# Patient Record
Sex: MALE | Race: WHITE | NOT HISPANIC OR LATINO | Employment: FULL TIME | URBAN - METROPOLITAN AREA
[De-identification: names, ages, dates, MRNs, and addresses within clinical notes are randomized per-mention and may not be internally consistent; named-entity substitution may affect disease eponyms.]

---

## 2019-06-06 ENCOUNTER — OFFICE VISIT (OUTPATIENT)
Dept: FAMILY MEDICINE CLINIC | Facility: CLINIC | Age: 54
End: 2019-06-06
Payer: COMMERCIAL

## 2019-06-06 VITALS
HEART RATE: 64 BPM | TEMPERATURE: 99.9 F | OXYGEN SATURATION: 96 % | RESPIRATION RATE: 18 BRPM | SYSTOLIC BLOOD PRESSURE: 102 MMHG | HEIGHT: 71 IN | DIASTOLIC BLOOD PRESSURE: 64 MMHG | BODY MASS INDEX: 23.13 KG/M2 | WEIGHT: 165.2 LBS

## 2019-06-06 DIAGNOSIS — J06.9 VIRAL UPPER RESPIRATORY ILLNESS: Primary | ICD-10-CM

## 2019-06-06 DIAGNOSIS — H61.23 BILATERAL IMPACTED CERUMEN: ICD-10-CM

## 2019-06-06 LAB — S PYO AG THROAT QL: NEGATIVE

## 2019-06-06 PROCEDURE — 87880 STREP A ASSAY W/OPTIC: CPT | Performed by: NURSE PRACTITIONER

## 2019-06-06 PROCEDURE — 99213 OFFICE O/P EST LOW 20 MIN: CPT | Performed by: NURSE PRACTITIONER

## 2019-06-06 PROCEDURE — 3008F BODY MASS INDEX DOCD: CPT | Performed by: NURSE PRACTITIONER

## 2019-06-21 ENCOUNTER — OFFICE VISIT (OUTPATIENT)
Dept: FAMILY MEDICINE CLINIC | Facility: CLINIC | Age: 54
End: 2019-06-21
Payer: COMMERCIAL

## 2019-06-21 VITALS
WEIGHT: 159.7 LBS | DIASTOLIC BLOOD PRESSURE: 86 MMHG | HEIGHT: 71 IN | SYSTOLIC BLOOD PRESSURE: 120 MMHG | BODY MASS INDEX: 22.36 KG/M2 | HEART RATE: 60 BPM | RESPIRATION RATE: 18 BRPM

## 2019-06-21 DIAGNOSIS — Z12.11 ENCOUNTER FOR SCREENING COLONOSCOPY: Primary | ICD-10-CM

## 2019-06-21 DIAGNOSIS — Z00.00 WELLNESS EXAMINATION: Chronic | ICD-10-CM

## 2019-06-21 LAB
CHOLEST SERPL-MCNC: 149 MG/DL (ref 50–200)
EST. AVERAGE GLUCOSE BLD GHB EST-MCNC: 108 MG/DL
HBA1C MFR BLD: 5.4 % (ref 4.2–6.3)
HDLC SERPL-MCNC: 41 MG/DL (ref 40–60)
LDLC SERPL CALC-MCNC: 87 MG/DL (ref 0–100)
NONHDLC SERPL-MCNC: 108 MG/DL
TRIGL SERPL-MCNC: 106 MG/DL

## 2019-06-21 PROCEDURE — 83036 HEMOGLOBIN GLYCOSYLATED A1C: CPT | Performed by: FAMILY MEDICINE

## 2019-06-21 PROCEDURE — 80061 LIPID PANEL: CPT | Performed by: FAMILY MEDICINE

## 2019-06-21 PROCEDURE — 3044F HG A1C LEVEL LT 7.0%: CPT | Performed by: NURSE PRACTITIONER

## 2019-06-21 PROCEDURE — 36415 COLL VENOUS BLD VENIPUNCTURE: CPT | Performed by: FAMILY MEDICINE

## 2019-06-21 PROCEDURE — 99396 PREV VISIT EST AGE 40-64: CPT | Performed by: FAMILY MEDICINE

## 2019-06-28 ENCOUNTER — TELEPHONE (OUTPATIENT)
Dept: FAMILY MEDICINE CLINIC | Facility: CLINIC | Age: 54
End: 2019-06-28

## 2019-07-03 ENCOUNTER — TELEPHONE (OUTPATIENT)
Dept: FAMILY MEDICINE CLINIC | Facility: CLINIC | Age: 54
End: 2019-07-03

## 2019-11-14 ENCOUNTER — OFFICE VISIT (OUTPATIENT)
Dept: GASTROENTEROLOGY | Facility: CLINIC | Age: 54
End: 2019-11-14
Payer: COMMERCIAL

## 2019-11-14 VITALS
DIASTOLIC BLOOD PRESSURE: 69 MMHG | HEART RATE: 44 BPM | SYSTOLIC BLOOD PRESSURE: 109 MMHG | BODY MASS INDEX: 23.38 KG/M2 | WEIGHT: 167 LBS | TEMPERATURE: 96.8 F | HEIGHT: 71 IN

## 2019-11-14 DIAGNOSIS — K20.0 EOSINOPHILIC ESOPHAGITIS: Primary | ICD-10-CM

## 2019-11-14 DIAGNOSIS — R13.10 DYSPHAGIA, UNSPECIFIED TYPE: ICD-10-CM

## 2019-11-14 DIAGNOSIS — Z12.11 ENCOUNTER FOR SCREENING COLONOSCOPY: ICD-10-CM

## 2019-11-14 PROCEDURE — 99244 OFF/OP CNSLTJ NEW/EST MOD 40: CPT | Performed by: INTERNAL MEDICINE

## 2019-11-14 RX ORDER — OMEPRAZOLE 40 MG/1
CAPSULE, DELAYED RELEASE ORAL
Refills: 2 | COMMUNITY
Start: 2019-11-05 | End: 2019-11-14

## 2019-11-14 RX ORDER — OMEPRAZOLE 40 MG/1
40 CAPSULE, DELAYED RELEASE ORAL
Qty: 60 CAPSULE | Refills: 2 | Status: SHIPPED | OUTPATIENT
Start: 2019-11-14 | End: 2019-12-16

## 2019-11-14 NOTE — PROGRESS NOTES
[facility] - Outpatient Consultation  Diony Alvarenga 49 y o  male MRN: [id number]  Encounter: [id number]          ASSESSMENT AND PLAN:    Diony Alvarenga is a 49 y o  male who presents with complaint of eosinophils on prior EGD esophageal biopsies, possibly from GERD vs EoE  Now on PPI but he has not received repeat EGD with biopsies to distinguish between the 2 conditions  Dysphagia likely from this underlying process, now somewhat improved  1  Eosinophilic esophagitis    2  Encounter for screening colonoscopy    3  Dysphagia, unspecified type      Orders Placed This Encounter   Procedures    EGD     -- BID PPI for now  -- Repeat EGD with biopsies to evaluate for EoE vs GERD  -- Further recs pending above  -- Next colonsocopy as previously recommended    ______________________________________________________________________    HPI:    Diony Alvarenga is a 49 y o  male who presents with complaint of possible EoE  Here for evaluation of possible EoE  January 2018 had an SSA    He has neen having dysphagia for several months  Initially meat but progressed to other foods  He saw a GI phsyician in August, and he had an empiric dilation  He was diagnosed with EoE at that time  He was put on omeprazole 40mg daily  Since that time he has experienced improvement from dysphagia  No heartburn, odynophagia  Doesn't smoke or drink alcohol  REVIEW OF SYSTEMS:  10 point ROS reviewed and negative, except as above    Historical Information   History reviewed  No pertinent past medical history    Past Surgical History:   Procedure Laterality Date    COLONOSCOPY      UPPER GASTROINTESTINAL ENDOSCOPY       Social History   Social History     Substance and Sexual Activity   Alcohol Use Yes     Social History     Substance and Sexual Activity   Drug Use Never     Social History     Tobacco Use   Smoking Status Never Smoker   Smokeless Tobacco Never Used     Family History   Problem Relation Age of Onset    Kidney cancer Father     Aortic aneurysm Father         AAA    Leukemia Family     Diabetes Family        Meds/Allergies       Current Outpatient Medications:     omeprazole (PriLOSEC) 40 MG capsule    No Known Allergies        Objective     Blood pressure 109/69, pulse (!) 44, temperature (!) 96 8 °F (36 °C), temperature source Tympanic, height 5' 11" (1 803 m), weight 75 8 kg (167 lb)  Body mass index is 23 29 kg/m²  PHYSICAL EXAM:      General Appearance:   Alert, cooperative, no distress   HEENT:   Normocephalic, atraumatic, anicteric  Neck:  Supple, symmetrical, trachea midline   Lungs:   Clear to auscultation bilaterally; no rales, rhonchi or wheezing; respirations unlabored    Heart[de-identified]   Regular rate and rhythm; no murmur, rub, or gallop  Abdomen:   Soft, non-tender, non-distended; normal bowel sounds; no masses, no organomegaly    Genitalia:   Deferred    Rectal:   Deferred    Extremities:  No cyanosis, clubbing or edema    Pulses:  2+ and symmetric    Skin:  No jaundice, rashes, or lesions    Lymph nodes:  No palpable cervical lymphadenopathy        Lab Results:   No visits with results within 1 Day(s) from this visit  Latest known visit with results is:   Office Visit on 06/21/2019   Component Date Value    Cholesterol 06/21/2019 149     Triglycerides 06/21/2019 106     HDL, Direct 06/21/2019 41     LDL Calculated 06/21/2019 87     Non-HDL-Chol (CHOL-HDL) 06/21/2019 108     Hemoglobin A1C 06/21/2019 5 4     EAG 06/21/2019 108      No results found for: WBC, HGB, HCT, MCV, PLT    No results found for: NA, SODIUM, K, CL, CO2, ANIONGAP, AGAP, BUN, CREATININE, GLUC, GLUF, CALCIUM, AST, ALT, ALKPHOS, PROT, TP, BILITOT, TBILI, EGFR      Radiology Results:   No results found

## 2019-11-14 NOTE — PATIENT INSTRUCTIONS
EGD scheduled with Dr Crow at [facility] on 12/3/2019  Christine Martinez gave patient verbal instructions/paper work given  Patient aware transportation arranged   Jaciel vega

## 2019-11-14 NOTE — H&P (VIEW-ONLY)
[facility] - Outpatient Consultation  Reine Nyhan 49 y o  male MRN: [id number]  Encounter: [id number]          ASSESSMENT AND PLAN:    Reine Nyhan is a 49 y o  male who presents with complaint of eosinophils on prior EGD esophageal biopsies, possibly from GERD vs EoE  Now on PPI but he has not received repeat EGD with biopsies to distinguish between the 2 conditions  Dysphagia likely from this underlying process, now somewhat improved  1  Eosinophilic esophagitis    2  Encounter for screening colonoscopy    3  Dysphagia, unspecified type      Orders Placed This Encounter   Procedures    EGD     -- BID PPI for now  -- Repeat EGD with biopsies to evaluate for EoE vs GERD  -- Further recs pending above  -- Next colonsocopy as previously recommended    ______________________________________________________________________    HPI:    Reine Nyhan is a 49 y o  male who presents with complaint of possible EoE  Here for evaluation of possible EoE  January 2018 had an SSA    He has neen having dysphagia for several months  Initially meat but progressed to other foods  He saw a GI phsyician in August, and he had an empiric dilation  He was diagnosed with EoE at that time  He was put on omeprazole 40mg daily  Since that time he has experienced improvement from dysphagia  No heartburn, odynophagia  Doesn't smoke or drink alcohol  REVIEW OF SYSTEMS:  10 point ROS reviewed and negative, except as above    Historical Information   History reviewed  No pertinent past medical history    Past Surgical History:   Procedure Laterality Date    COLONOSCOPY      UPPER GASTROINTESTINAL ENDOSCOPY       Social History   Social History     Substance and Sexual Activity   Alcohol Use Yes     Social History     Substance and Sexual Activity   Drug Use Never     Social History     Tobacco Use   Smoking Status Never Smoker   Smokeless Tobacco Never Used     Family History   Problem Relation Age of Onset    Kidney cancer Father     Aortic aneurysm Father         AAA    Leukemia Family     Diabetes Family        Meds/Allergies       Current Outpatient Medications:     omeprazole (PriLOSEC) 40 MG capsule    No Known Allergies        Objective     Blood pressure 109/69, pulse (!) 44, temperature (!) 96 8 °F (36 °C), temperature source Tympanic, height 5' 11" (1 803 m), weight 75 8 kg (167 lb)  Body mass index is 23 29 kg/m²  PHYSICAL EXAM:      General Appearance:   Alert, cooperative, no distress   HEENT:   Normocephalic, atraumatic, anicteric  Neck:  Supple, symmetrical, trachea midline   Lungs:   Clear to auscultation bilaterally; no rales, rhonchi or wheezing; respirations unlabored    Heart[de-identified]   Regular rate and rhythm; no murmur, rub, or gallop  Abdomen:   Soft, non-tender, non-distended; normal bowel sounds; no masses, no organomegaly    Genitalia:   Deferred    Rectal:   Deferred    Extremities:  No cyanosis, clubbing or edema    Pulses:  2+ and symmetric    Skin:  No jaundice, rashes, or lesions    Lymph nodes:  No palpable cervical lymphadenopathy        Lab Results:   No visits with results within 1 Day(s) from this visit  Latest known visit with results is:   Office Visit on 06/21/2019   Component Date Value    Cholesterol 06/21/2019 149     Triglycerides 06/21/2019 106     HDL, Direct 06/21/2019 41     LDL Calculated 06/21/2019 87     Non-HDL-Chol (CHOL-HDL) 06/21/2019 108     Hemoglobin A1C 06/21/2019 5 4     EAG 06/21/2019 108      No results found for: WBC, HGB, HCT, MCV, PLT    No results found for: NA, SODIUM, K, CL, CO2, ANIONGAP, AGAP, BUN, CREATININE, GLUC, GLUF, CALCIUM, AST, ALT, ALKPHOS, PROT, TP, BILITOT, TBILI, EGFR      Radiology Results:   No results found

## 2019-11-18 ENCOUNTER — TELEPHONE (OUTPATIENT)
Dept: GASTROENTEROLOGY | Facility: AMBULARY SURGERY CENTER | Age: 54
End: 2019-11-18

## 2019-11-18 NOTE — TELEPHONE ENCOUNTER
Patients GI provider:  Dr Zuleika Marshall     Number to return call: [phone]    Reason for call: Pt is calling to set up transportation for his procedure     Scheduled procedure/appointment date if applicable: procedure   12/03

## 2019-11-20 ENCOUNTER — TELEPHONE (OUTPATIENT)
Dept: GASTROENTEROLOGY | Facility: AMBULARY SURGERY CENTER | Age: 54
End: 2019-11-20

## 2019-11-20 NOTE — TELEPHONE ENCOUNTER
Patients GI provider:  Dr Zuleika Marshall     Number to return call: [phone]    Reason for call: Pt called stating he called his insurance to start a prior auth but they advised him the office have to call  Pt not sure if it is needed but want to make sure he is fully covered  Scheduled procedure/appointment date if applicable: Apt/procedure   n/a

## 2019-11-27 ENCOUNTER — ANESTHESIA EVENT (OUTPATIENT)
Dept: GASTROENTEROLOGY | Facility: MEDICAL CENTER | Age: 54
End: 2019-11-27

## 2019-12-03 ENCOUNTER — ANESTHESIA (OUTPATIENT)
Dept: GASTROENTEROLOGY | Facility: MEDICAL CENTER | Age: 54
End: 2019-12-03

## 2019-12-03 ENCOUNTER — HOSPITAL ENCOUNTER (OUTPATIENT)
Dept: GASTROENTEROLOGY | Facility: MEDICAL CENTER | Age: 54
Setting detail: OUTPATIENT SURGERY
Discharge: HOME | End: 2019-12-03
Attending: INTERNAL MEDICINE | Admitting: INTERNAL MEDICINE
Payer: COMMERCIAL

## 2019-12-03 VITALS
WEIGHT: 167 LBS | BODY MASS INDEX: 23.38 KG/M2 | RESPIRATION RATE: 16 BRPM | OXYGEN SATURATION: 96 % | HEART RATE: 36 BPM | SYSTOLIC BLOOD PRESSURE: 114 MMHG | DIASTOLIC BLOOD PRESSURE: 61 MMHG | HEIGHT: 71 IN | TEMPERATURE: 98.5 F

## 2019-12-03 DIAGNOSIS — K20.0 EOSINOPHILIC ESOPHAGITIS: ICD-10-CM

## 2019-12-03 DIAGNOSIS — R13.10 DYSPHAGIA, UNSPECIFIED TYPE: ICD-10-CM

## 2019-12-03 PROCEDURE — 88312 SPECIAL STAINS GROUP 1: CPT | Performed by: PATHOLOGY

## 2019-12-03 PROCEDURE — 88305 TISSUE EXAM BY PATHOLOGIST: CPT | Performed by: PATHOLOGY

## 2019-12-03 PROCEDURE — 43239 EGD BIOPSY SINGLE/MULTIPLE: CPT | Performed by: INTERNAL MEDICINE

## 2019-12-03 RX ORDER — PROPOFOL 10 MG/ML
INJECTION, EMULSION INTRAVENOUS AS NEEDED
Status: DISCONTINUED | OUTPATIENT
Start: 2019-12-03 | End: 2019-12-03 | Stop reason: SURG

## 2019-12-03 RX ORDER — SODIUM CHLORIDE 9 MG/ML
125 INJECTION, SOLUTION INTRAVENOUS CONTINUOUS
Status: DISCONTINUED | OUTPATIENT
Start: 2019-12-03 | End: 2019-12-07 | Stop reason: HOSPADM

## 2019-12-03 RX ADMIN — SODIUM CHLORIDE: 0.9 INJECTION, SOLUTION INTRAVENOUS at 13:45

## 2019-12-03 RX ADMIN — PROPOFOL 100 MG: 10 INJECTION, EMULSION INTRAVENOUS at 13:52

## 2019-12-03 RX ADMIN — PROPOFOL 200 MG: 10 INJECTION, EMULSION INTRAVENOUS at 13:47

## 2019-12-03 RX ADMIN — SODIUM CHLORIDE 125 ML/HR: 0.9 INJECTION, SOLUTION INTRAVENOUS at 13:24

## 2019-12-03 NOTE — ANESTHESIA PREPROCEDURE EVALUATION
Review of Systems/Medical History  Patient summary reviewed  Chart reviewed      Cardiovascular  Exercise tolerance (METS): >4,     Pulmonary  Negative pulmonary ROS        GI/Hepatic    GERD ,        Negative  ROS        Endo/Other  Negative endo/other ROS      GYN       Hematology  Negative hematology ROS      Musculoskeletal  Negative musculoskeletal ROS        Neurology  Negative neurology ROS      Psychology   Negative psychology ROS              Physical Exam    Airway    Mallampati score: II  TM Distance: <3 FB  Neck ROM: full     Dental       Cardiovascular  Rhythm: regular, Rate: normal,     Pulmonary  Breath sounds clear to auscultation,     Other Findings        Anesthesia Plan  ASA Score- 2     Anesthesia Type- IV sedation with anesthesia with ASA Monitors  Additional Monitors:   Airway Plan:         Plan Factors-Patient not instructed to abstain from smoking on day of procedure  Patient did not smoke on day of surgery  Induction- intravenous  Postoperative Plan-     Informed Consent- Anesthetic plan and risks discussed with patient

## 2019-12-03 NOTE — DISCHARGE INSTRUCTIONS
Upper Endoscopy   WHAT YOU NEED TO KNOW:   An upper endoscopy is also called an upper gastrointestinal (GI) endoscopy, or an esophagogastroduodenoscopy (EGD)  You may feel bloated, gassy, or have some abdominal discomfort after your procedure  Your throat may be sore for 24 to 36 hours  You may burp or pass gas from air that is still inside your body  DISCHARGE INSTRUCTIONS:   Call 911 for any of the following:   · You have sudden chest pain or trouble breathing  Seek care immediately if:   · You feel dizzy or faint  · You have trouble swallowing  · Your bowel movements are very dark or black  · Your abdomen is hard and firm and you have severe pain  · You vomit blood  Contact your healthcare provider if:   · You feel full or bloated and cannot burp or pass gas  · You have not had a bowel movement for 3 days after your procedure  · You have neck pain  · You have a fever or chills  · You have nausea or are vomiting  · You have a rash or hives  · You have questions or concerns about your endoscopy  Relieve a sore throat:  Suck on throat lozenges or crushed ice  Gargle with a small amount of warm salt water  Mix 1 teaspoon of salt and 1 cup of warm water to make salt water  Relieve gas and discomfort from bloating:  Lie on your right side with a heating pad on your abdomen  Take short walks to help pass gas  Eat small meals until bloating is relieved  Rest after your procedure: You have been given medicine to relax you  Do not  drive or make important decisions until the day after your procedure  Return to your normal activity as directed  You can usually return to work the day after your procedure  Follow up with your healthcare provider as directed:  Write down your questions so you remember to ask them during your visits     © 2017 [address] is for End User's use only and may not be sold, redistributed or otherwise used for commercial purposes  All illustrations and images included in CareNotes® are the copyrighted property of A D A M [employer] Inc  or Ezekiel [employer]ges  The above information is an  only  It is not intended as medical advice for individual conditions or treatments  Talk to your doctor, nurse or pharmacist before following any medical regimen to see if it is safe and effective for you

## 2019-12-03 NOTE — ANESTHESIA POSTPROCEDURE EVALUATION
Post-Op Assessment Note    CV Status:  Stable  Pain Score: 1    Pain management: adequate     Mental Status:  Alert and awake   Hydration Status:  Euvolemic   PONV Controlled:  Controlled   Airway Patency:  Patent   Post Op Vitals Reviewed: Yes      Staff: Anesthesiologist           /61 (12/03/19 1415)    Temp      Pulse (!) 36 (12/03/19 1415)   Resp 16 (12/03/19 1415)    SpO2 96 % (12/03/19 1415)

## 2019-12-16 ENCOUNTER — OFFICE VISIT (OUTPATIENT)
Dept: GASTROENTEROLOGY | Facility: CLINIC | Age: 54
End: 2019-12-16
Payer: COMMERCIAL

## 2019-12-16 VITALS
BODY MASS INDEX: 23.24 KG/M2 | SYSTOLIC BLOOD PRESSURE: 131 MMHG | HEART RATE: 42 BPM | WEIGHT: 166 LBS | DIASTOLIC BLOOD PRESSURE: 76 MMHG | HEIGHT: 71 IN | TEMPERATURE: 94.8 F

## 2019-12-16 DIAGNOSIS — K21.9 GASTROESOPHAGEAL REFLUX DISEASE, ESOPHAGITIS PRESENCE NOT SPECIFIED: Primary | ICD-10-CM

## 2019-12-16 PROCEDURE — 99214 OFFICE O/P EST MOD 30 MIN: CPT | Performed by: INTERNAL MEDICINE

## 2019-12-16 RX ORDER — OMEPRAZOLE 20 MG/1
20 CAPSULE, DELAYED RELEASE ORAL
Qty: 30 CAPSULE | Refills: 5 | Status: SHIPPED | OUTPATIENT
Start: 2019-12-16 | End: 2020-12-21 | Stop reason: SDUPTHER

## 2019-12-16 NOTE — PROGRESS NOTES
[facility] - Outpatient Follow-up Note  Harris Pérez 49 y o  male MRN: [id number]  Encounter: [id number]          ASSESSMENT AND PLAN:    Harris Pérez is a 49 y o  male who presents with complaint of EoE vs GERD  We reviewed the pathology and explained he likely has GERD (with improvement biopsies on PPI) vs PPI responsive EoE  He is currently asymptomatic  Available labs and imaging reviewed  1  Gastroesophageal reflux disease, esophagitis presence not specified      No orders of the defined types were placed in this encounter  -- Continue PPI but at lowest dose (20mg once daily to be taken 30 minutes before breakfast)  -- Plan to repeat EGD in 6 months  If that is normal, would stop PPI altogether and repeat in another 6 months after that        ______________________________________________________________________    SUBJECTIVE:    Harris Pérez is a 49 y o  male who presents with complaint of possible EoE vs GERD  No heartburn  No belching  No dysphagia, odynophagia, nausea, vomiting, abdominal pain, diarrhea  Mil dconstipation the past few days when he took iron, now better  No BRBPR or black stools  · Irregular Z-line 45 cm from the incisors  · The esophagus appeared normal  · Performed multiple biopsies in the upper third of the esophagus, middle third of the esophagus and lower third of the esophagus  · The stomach and duodenum appeared normal     A  Esophagogastric junction, eg junction biopsy/esophagitis biopsy:  -Benign gastroesophageal junction mucosa with mild chronic inflammation and reactive epithelial changes   -Focal pancreatic heterotropia seen   -No evidence of eosinophilic esophagitis   -No evidence of Goblet cell metaplasia/ Brito esophagus   -No evidence of glandular dysplasia or malignancy seen  Fungal stains will be ordered and the result will be issued in the supplemental report     B   Esophagus, lower esophagus r/o EOE biopsy:  -Benign squamous epithelium with mild chronic inflammation   -No evidence of eosinophilic esophagitis   -No evidence of Goblet cell metaplasia/ Britos esophagus   -No evidence of dysplasia or malignancy seen        C  Esophagus, mid/upper esophagus biopsy r/o  EOE biopsy:  -Benign squamous epithelium with vascular congestion & mild chronic inflammation   -No evidence of eosinophilic esophagitis   -No evidence of Goblet cell metaplasia/ Britos esophagus   -No evidence of dysplasia or malignancy seen    REVIEW OF SYSTEMS IS OTHERWISE NEGATIVE  Historical Information   Past Medical History:   Diagnosis Date    Abnormal findings on esophagogastroduodenoscopy (EGD)     Eosinophilic esophagitis     GERD (gastroesophageal reflux disease)      Past Surgical History:   Procedure Laterality Date    COLONOSCOPY      UPPER GASTROINTESTINAL ENDOSCOPY       Social History   Social History     Substance and Sexual Activity   Alcohol Use Yes     Social History     Substance and Sexual Activity   Drug Use Never     Social History     Tobacco Use   Smoking Status Never Smoker   Smokeless Tobacco Never Used     Family History   Problem Relation Age of Onset    Kidney cancer Father     Aortic aneurysm Father         AAA    Leukemia Family     Diabetes Family        Meds/Allergies       Current Outpatient Medications:     omeprazole (PriLOSEC) 40 MG capsule    No Known Allergies        Objective     Blood pressure 131/76, pulse (!) 42, temperature (!) 94 8 °F (34 9 °C), temperature source Tympanic, height 5' 11" (1 803 m), weight 75 3 kg (166 lb)  Body mass index is 23 15 kg/m²  PHYSICAL EXAM:      General Appearance:   Alert, cooperative, no distress   HEENT:   Normocephalic, atraumatic, anicteric       Neck:  Supple, symmetrical, trachea midline   Lungs:   Clear to auscultation bilaterally; no rales, rhonchi or wheezing; respirations unlabored    Heart[de-identified]   Regular rate and rhythm; no murmur, rub, or gallop  Abdomen:   Soft, non-tender, non-distended; normal bowel sounds; no masses, no organomegaly    Genitalia:   Deferred    Rectal:   Deferred    Extremities:  No cyanosis, clubbing or edema    Pulses:  2+ and symmetric    Skin:  No jaundice, rashes, or lesions    Lymph nodes:  No palpable cervical lymphadenopathy        Lab Results:   No visits with results within 1 Day(s) from this visit  Latest known visit with results is:   Hospital Outpatient Visit on 12/03/2019   Component Date Value    Case Report 12/03/2019                      Value:Surgical Pathology Report                         Case: [id number]                                   Authorizing Provider:  Kalie Garza MD    Collected:           12/03/2019 1352              Ordering Location:     [facility]        Received:            12/03/2019 [facility] Endoscopy                                                     Pathologist:           David Alas MD                                                              Specimens:   A) - Esophagogastric junction, eg junction biopsy/esophagitis                                       B) - Esophagus, lower esophagus r/o eoe                                                             C) - Esophagus, mid/upper esophagus biopsy r/o eoe                                         Addendum 12/03/2019                      Value: This result contains rich text formatting which cannot be displayed here   Final Diagnosis 12/03/2019                      Value: This result contains rich text formatting which cannot be displayed here   Additional Information 12/03/2019                      Value: This result contains rich text formatting which cannot be displayed here  [facility] Gross Description 12/03/2019                      Value: This result contains rich text formatting which cannot be displayed here  Radiology Results:   No results found

## 2019-12-16 NOTE — PATIENT INSTRUCTIONS
Today we discussed:  -- We will decrease the omeprazole to 20mg every day to be taken 30 minutes before breakfast  -- We will repeat an upper endoscopy in 6 months to see if there is any change/     Please b in touch with any questions, concerns or updates

## 2020-02-10 ENCOUNTER — OFFICE VISIT (OUTPATIENT)
Dept: PODIATRY | Facility: CLINIC | Age: 55
End: 2020-02-10
Payer: COMMERCIAL

## 2020-02-10 VITALS
SYSTOLIC BLOOD PRESSURE: 109 MMHG | BODY MASS INDEX: 23.32 KG/M2 | HEIGHT: 71 IN | HEART RATE: 60 BPM | WEIGHT: 166.6 LBS | DIASTOLIC BLOOD PRESSURE: 72 MMHG

## 2020-02-10 DIAGNOSIS — M21.42 PES PLANUS OF BOTH FEET: Primary | ICD-10-CM

## 2020-02-10 DIAGNOSIS — M21.41 PES PLANUS OF BOTH FEET: Primary | ICD-10-CM

## 2020-02-10 DIAGNOSIS — M79.671 FOOT PAIN, RIGHT: ICD-10-CM

## 2020-02-10 DIAGNOSIS — M20.11 VALGUS DEFORMITY OF BOTH GREAT TOES: ICD-10-CM

## 2020-02-10 DIAGNOSIS — M20.12 VALGUS DEFORMITY OF BOTH GREAT TOES: ICD-10-CM

## 2020-02-10 PROCEDURE — 1036F TOBACCO NON-USER: CPT | Performed by: PODIATRIST

## 2020-02-10 PROCEDURE — 99203 OFFICE O/P NEW LOW 30 MIN: CPT | Performed by: PODIATRIST

## 2020-02-10 PROCEDURE — 3008F BODY MASS INDEX DOCD: CPT | Performed by: PODIATRIST

## 2020-02-10 NOTE — PROGRESS NOTES
Assessment/Plan:       Diagnoses and all orders for this visit:    Foot pain, right  -     X-ray foot right 3+ views; Future    Pes planus of both feet    Valgus deformity of both great toes  Comments:  asymptomatic, monitor for now, arch supports would help  1  Diagnosis and options discussed  Reviewed patients XRays with him  Minor TN spurring dorsally with adult flatfoot noted  Patient's issues seem to be musculoskeletal related due to age and adult acquired flatfoot deformity  This should respond well to a custom orthotic device within issues  Referral sent  He does not show symptoms of posterior tibial tendon dysfunction at this time but he is at risk for developing this should his foot not be adequately supported  2  Educated patient on the mechanics of his feet and other conservative options  I do not feel the patient would need reconstruction at this point  I would like to check his progress in approximately 3-4 months after he has been wearing the orthotic devices for least 4-6 weeks  Subjective:      Patient ID: Tanisha Bianchi is a 49 y o  male  Patient presents with foot pain  He had an achilles injury on his right ankle a while ago which resolved with some therapy  He began to run again and last August he started getting a weird arch pain  HE stopped running and switched to bike and swimming  He notices when he plantarflexes and inverts he can feel this sharp pain  He is very active but this is really hampering his exercises and day to day activity  He has not tried any inserts  He tries not to take NSAIDs due to his GERD  The following portions of the patient's history were reviewed and updated as appropriate: allergies, current medications, past family history, past medical history, past social history, past surgical history and problem list     Review of Systems   Constitutional: Negative  Respiratory: Negative for cough and shortness of breath      Cardiovascular: Negative for leg swelling  Gastrointestinal: Negative for diarrhea and nausea  Musculoskeletal: Positive for arthralgias and gait problem  Skin: Negative for color change  Neurological: Negative for numbness  Objective:      /72   Pulse 60   Ht 5' 11" (1 803 m) Comment: verbal  Wt 75 6 kg (166 lb 9 6 oz)   BMI 23 24 kg/m²          Physical Exam   Constitutional: He is oriented to person, place, and time  He appears well-developed and well-nourished  No distress  Cardiovascular: Intact distal pulses  Pulses:       Dorsalis pedis pulses are 2+ on the right side, and 2+ on the left side  Posterior tibial pulses are 2+ on the right side, and 2+ on the left side  Pulmonary/Chest: Effort normal  No respiratory distress  Musculoskeletal: Normal range of motion  He exhibits tenderness and deformity (Bilateral hallux abductovalgus without metatarsophalangeal joint crepitus or limited range of motion  No pain )  On stance patient is over pronated with moderate calcaneal valgus and positive Helbing sign  There is talar head peaking bilaterally without posterior tibial tendon pain or weakness  No anterior tibial tendon pain or weakness  No crepitus with talonavicular joint range of motion  There is tenderness over the talonavicular joint but not at the tuberosity  Feet:   Right Foot:   Protective Sensation: 10 sites tested  10 sites sensed  Left Foot:   Protective Sensation: 10 sites tested  10 sites sensed  Neurological: He is alert and oriented to person, place, and time  No sensory deficit  Skin: Skin is warm and dry  No rash noted  Psychiatric: He has a normal mood and affect  Vitals reviewed  XRay 3 views right foot shows  1  Calcaneal inclination angle grossly decreased (9 degrees)  2  Mearys angle increased with 33% talar head peeking on AP,   3  Small navicular spur dorsally TN joint

## 2020-02-18 ENCOUNTER — EVALUATION (OUTPATIENT)
Dept: PHYSICAL THERAPY | Facility: CLINIC | Age: 55
End: 2020-02-18
Payer: COMMERCIAL

## 2020-02-18 ENCOUNTER — OFFICE VISIT (OUTPATIENT)
Dept: PHYSICAL THERAPY | Facility: CLINIC | Age: 55
End: 2020-02-18

## 2020-02-18 DIAGNOSIS — M79.671 FOOT PAIN, RIGHT: ICD-10-CM

## 2020-02-18 DIAGNOSIS — M79.671 FOOT PAIN, RIGHT: Primary | ICD-10-CM

## 2020-02-18 DIAGNOSIS — M21.42 PES PLANUS OF BOTH FEET: ICD-10-CM

## 2020-02-18 DIAGNOSIS — M20.11 VALGUS DEFORMITY OF BOTH GREAT TOES: ICD-10-CM

## 2020-02-18 DIAGNOSIS — M20.12 VALGUS DEFORMITY OF BOTH GREAT TOES: ICD-10-CM

## 2020-02-18 DIAGNOSIS — M21.41 PES PLANUS OF BOTH FEET: ICD-10-CM

## 2020-02-18 PROCEDURE — 97162 PT EVAL MOD COMPLEX 30 MIN: CPT | Performed by: PHYSICAL THERAPIST

## 2020-02-18 PROCEDURE — 97760 ORTHOTIC MGMT&TRAING 1ST ENC: CPT | Performed by: PHYSICAL THERAPIST

## 2020-02-18 NOTE — PROGRESS NOTES
PT Evaluation     Today's date: 2020  Patient name: Irma Davies  : 1965  MRN: [id number]  Referring provider: JOSUE Castellon  Dx:   Encounter Diagnosis     ICD-10-CM    1  Foot pain, right M79 671 Ambulatory referral to Physical Therapy   2  Pes planus of both feet M21 41 Ambulatory referral to Physical Therapy    M21 42    3  Valgus deformity of both great toes M20 11 Ambulatory referral to Physical Therapy    M20 12     asymptomatic, monitor for now, arch supports would help  Assessment/Plan  Pt should benefit from custom orthotics to decrease foot pain and improve LOF  Cast for cusotm orthotics, fit in 2-3 wks  Subjective Evaluation    History of Present Illness  Mechanism of injury: Insidious onset of R ankle pain (medial/dorsal) last August   Also c/o arch pain  He has stopped running due to symptoms  He has not had orthotics in the past  No c/o L foot/ankle pain    Pain  Current pain ratin  At best pain ratin  At worst pain ratin      Diagnostic Tests  X-ray: abnormal  Treatments  No previous or current treatments  Patient Goals  Patient goals for therapy: return to sport/leisure activities          Objective    bilat bunions/hallux valgus R > L  bilat pes planus  LMI R 8 deg manish, L 8 deg manish  Tibia R 3 deg joseph, L 3 deg joseph  Hallux DF PROM wfl, no pain  Ankle PROM wnl; pain end range inversion  Gait: excessive STJ pronation, lateral toe gripping  MMT DF, inv wnl  Mild pain with inv    Casted for custom orthotics

## 2020-03-05 ENCOUNTER — OFFICE VISIT (OUTPATIENT)
Dept: PHYSICAL THERAPY | Facility: CLINIC | Age: 55
End: 2020-03-05
Payer: COMMERCIAL

## 2020-03-05 ENCOUNTER — TREATMENT (OUTPATIENT)
Dept: FAMILY MEDICINE CLINIC | Facility: CLINIC | Age: 55
End: 2020-03-05

## 2020-03-05 DIAGNOSIS — M20.11 VALGUS DEFORMITY OF BOTH GREAT TOES: Primary | ICD-10-CM

## 2020-03-05 DIAGNOSIS — M20.12 VALGUS DEFORMITY OF BOTH GREAT TOES: Primary | ICD-10-CM

## 2020-03-05 DIAGNOSIS — D64.9 ANEMIA, UNSPECIFIED TYPE: Primary | ICD-10-CM

## 2020-03-05 DIAGNOSIS — M79.671 FOOT PAIN, RIGHT: ICD-10-CM

## 2020-03-05 PROCEDURE — L3010 FOOT LONGITUDINAL ARCH SUPPO: HCPCS | Performed by: PHYSICAL THERAPIST

## 2020-03-05 NOTE — PROGRESS NOTES
Dispensed othotics, IP in break in period  Pt reported comfort    Many need to narrow devices [FreeTextEntry1] : Ms. BRAVO presents today for a follow up visit of a history of a right MCA stroke with residual left sided facial spams and left sided weakness with a history of DM and cardiac disease. \par \par At this time she reports she is doing well. No changes since last seen. Had Botox today with Dr. Freitas. \par \par Reports poor sleep with excessive daytime sleepiness. She denies snoring since she lost weight. In addition, she continues to report delayed recall and word finding that is not progressive. Continues to follow with endocrinology as she reports her DM is not well controlled at this time.

## 2020-03-20 ENCOUNTER — APPOINTMENT (OUTPATIENT)
Dept: LAB | Facility: CLINIC | Age: 55
End: 2020-03-20
Payer: COMMERCIAL

## 2020-03-20 LAB
BASOPHILS # BLD AUTO: 0.03 THOUSANDS/ΜL (ref 0–0.1)
BASOPHILS NFR BLD AUTO: 1 % (ref 0–1)
EOSINOPHIL # BLD AUTO: 0.04 THOUSAND/ΜL (ref 0–0.61)
EOSINOPHIL NFR BLD AUTO: 1 % (ref 0–6)
ERYTHROCYTE [DISTWIDTH] IN BLOOD BY AUTOMATED COUNT: 14.6 % (ref 11.6–15.1)
FERRITIN SERPL-MCNC: 8 NG/ML (ref 8–388)
HCT VFR BLD AUTO: 42.2 % (ref 36.5–49.3)
HGB BLD-MCNC: 13.4 G/DL (ref 12–17)
IMM GRANULOCYTES # BLD AUTO: 0.02 THOUSAND/UL (ref 0–0.2)
IMM GRANULOCYTES NFR BLD AUTO: 0 % (ref 0–2)
LYMPHOCYTES # BLD AUTO: 0.67 THOUSANDS/ΜL (ref 0.6–4.47)
LYMPHOCYTES NFR BLD AUTO: 13 % (ref 14–44)
MCH RBC QN AUTO: 27.6 PG (ref 26.8–34.3)
MCHC RBC AUTO-ENTMCNC: 31.8 G/DL (ref 31.4–37.4)
MCV RBC AUTO: 87 FL (ref 82–98)
MONOCYTES # BLD AUTO: 0.46 THOUSAND/ΜL (ref 0.17–1.22)
MONOCYTES NFR BLD AUTO: 9 % (ref 4–12)
NEUTROPHILS # BLD AUTO: 3.88 THOUSANDS/ΜL (ref 1.85–7.62)
NEUTS SEG NFR BLD AUTO: 76 % (ref 43–75)
NRBC BLD AUTO-RTO: 0 /100 WBCS
PLATELET # BLD AUTO: 275 THOUSANDS/UL (ref 149–390)
PMV BLD AUTO: 10.6 FL (ref 8.9–12.7)
RBC # BLD AUTO: 4.85 MILLION/UL (ref 3.88–5.62)
WBC # BLD AUTO: 5.1 THOUSAND/UL (ref 4.31–10.16)

## 2020-03-20 PROCEDURE — 36415 COLL VENOUS BLD VENIPUNCTURE: CPT | Performed by: FAMILY MEDICINE

## 2020-03-20 PROCEDURE — 85025 COMPLETE CBC W/AUTO DIFF WBC: CPT | Performed by: FAMILY MEDICINE

## 2020-03-20 PROCEDURE — 82728 ASSAY OF FERRITIN: CPT | Performed by: FAMILY MEDICINE

## 2020-03-21 ENCOUNTER — PATIENT MESSAGE (OUTPATIENT)
Dept: FAMILY MEDICINE CLINIC | Facility: CLINIC | Age: 55
End: 2020-03-21

## 2020-04-21 ENCOUNTER — TELEPHONE (OUTPATIENT)
Dept: GASTROENTEROLOGY | Facility: CLINIC | Age: 55
End: 2020-04-21

## 2020-04-22 ENCOUNTER — OFFICE VISIT (OUTPATIENT)
Dept: FAMILY MEDICINE CLINIC | Facility: CLINIC | Age: 55
End: 2020-04-22
Payer: COMMERCIAL

## 2020-04-22 VITALS
OXYGEN SATURATION: 98 % | WEIGHT: 155 LBS | HEART RATE: 59 BPM | DIASTOLIC BLOOD PRESSURE: 80 MMHG | BODY MASS INDEX: 22.19 KG/M2 | SYSTOLIC BLOOD PRESSURE: 102 MMHG | HEIGHT: 70 IN

## 2020-04-22 DIAGNOSIS — L30.9 DERMATITIS: Primary | ICD-10-CM

## 2020-04-22 PROCEDURE — 99213 OFFICE O/P EST LOW 20 MIN: CPT | Performed by: FAMILY MEDICINE

## 2020-04-22 PROCEDURE — 1036F TOBACCO NON-USER: CPT | Performed by: FAMILY MEDICINE

## 2020-04-22 PROCEDURE — 3008F BODY MASS INDEX DOCD: CPT | Performed by: FAMILY MEDICINE

## 2020-04-22 RX ORDER — TRIAMCINOLONE ACETONIDE 1 MG/G
CREAM TOPICAL 2 TIMES DAILY
Qty: 15 G | Refills: 3 | Status: SHIPPED | OUTPATIENT
Start: 2020-04-22 | End: 2020-06-24

## 2020-05-11 ENCOUNTER — DOCUMENTATION (OUTPATIENT)
Dept: FAMILY MEDICINE CLINIC | Facility: CLINIC | Age: 55
End: 2020-05-11

## 2020-05-13 ENCOUNTER — PREP FOR PROCEDURE (OUTPATIENT)
Dept: GASTROENTEROLOGY | Facility: MEDICAL CENTER | Age: 55
End: 2020-05-13

## 2020-05-13 ENCOUNTER — OFFICE VISIT (OUTPATIENT)
Dept: PODIATRY | Facility: CLINIC | Age: 55
End: 2020-05-13
Payer: COMMERCIAL

## 2020-05-13 VITALS — WEIGHT: 166.6 LBS | BODY MASS INDEX: 23.85 KG/M2 | HEIGHT: 70 IN

## 2020-05-13 DIAGNOSIS — Z20.822 ENCOUNTER FOR LABORATORY TESTING FOR COVID-19 VIRUS: ICD-10-CM

## 2020-05-13 DIAGNOSIS — M76.821 POSTERIOR TIBIAL TENDONITIS, RIGHT: Primary | ICD-10-CM

## 2020-05-13 DIAGNOSIS — M21.41 PES PLANUS OF RIGHT FOOT: ICD-10-CM

## 2020-05-13 DIAGNOSIS — K20.0 EOSINOPHILIC ESOPHAGITIS: Primary | ICD-10-CM

## 2020-05-13 PROCEDURE — 99213 OFFICE O/P EST LOW 20 MIN: CPT | Performed by: PODIATRIST

## 2020-05-13 PROCEDURE — 3008F BODY MASS INDEX DOCD: CPT | Performed by: PODIATRIST

## 2020-05-13 PROCEDURE — 1036F TOBACCO NON-USER: CPT | Performed by: PODIATRIST

## 2020-05-15 ENCOUNTER — ANESTHESIA EVENT (OUTPATIENT)
Dept: GASTROENTEROLOGY | Facility: MEDICAL CENTER | Age: 55
End: 2020-05-15

## 2020-05-21 ENCOUNTER — EVALUATION (OUTPATIENT)
Dept: PHYSICAL THERAPY | Facility: CLINIC | Age: 55
End: 2020-05-21
Payer: COMMERCIAL

## 2020-05-21 DIAGNOSIS — M76.821 POSTERIOR TIBIAL TENDONITIS, RIGHT: ICD-10-CM

## 2020-05-21 DIAGNOSIS — M21.41 PES PLANUS OF RIGHT FOOT: ICD-10-CM

## 2020-05-21 PROCEDURE — 97035 APP MDLTY 1+ULTRASOUND EA 15: CPT | Performed by: PHYSICAL THERAPIST

## 2020-05-21 PROCEDURE — 97760 ORTHOTIC MGMT&TRAING 1ST ENC: CPT | Performed by: PHYSICAL THERAPIST

## 2020-05-21 PROCEDURE — 97161 PT EVAL LOW COMPLEX 20 MIN: CPT | Performed by: PHYSICAL THERAPIST

## 2020-05-21 PROCEDURE — 97140 MANUAL THERAPY 1/> REGIONS: CPT | Performed by: PHYSICAL THERAPIST

## 2020-05-27 ENCOUNTER — OFFICE VISIT (OUTPATIENT)
Dept: PHYSICAL THERAPY | Facility: CLINIC | Age: 55
End: 2020-05-27
Payer: COMMERCIAL

## 2020-05-27 DIAGNOSIS — M76.821 POSTERIOR TIBIAL TENDONITIS, RIGHT: Primary | ICD-10-CM

## 2020-05-27 DIAGNOSIS — M21.41 PES PLANUS OF RIGHT FOOT: ICD-10-CM

## 2020-05-27 PROCEDURE — 97110 THERAPEUTIC EXERCISES: CPT | Performed by: PHYSICAL THERAPIST

## 2020-05-27 PROCEDURE — 97014 ELECTRIC STIMULATION THERAPY: CPT | Performed by: PHYSICAL THERAPIST

## 2020-05-27 PROCEDURE — 97140 MANUAL THERAPY 1/> REGIONS: CPT | Performed by: PHYSICAL THERAPIST

## 2020-05-27 PROCEDURE — G0283 ELEC STIM OTHER THAN WOUND: HCPCS | Performed by: PHYSICAL THERAPIST

## 2020-05-28 ENCOUNTER — OFFICE VISIT (OUTPATIENT)
Dept: PHYSICAL THERAPY | Facility: CLINIC | Age: 55
End: 2020-05-28
Payer: COMMERCIAL

## 2020-05-28 DIAGNOSIS — M21.41 PES PLANUS OF RIGHT FOOT: ICD-10-CM

## 2020-05-28 DIAGNOSIS — M76.821 POSTERIOR TIBIAL TENDONITIS, RIGHT: Primary | ICD-10-CM

## 2020-05-28 PROCEDURE — 97110 THERAPEUTIC EXERCISES: CPT | Performed by: PHYSICAL THERAPIST

## 2020-05-28 PROCEDURE — 97035 APP MDLTY 1+ULTRASOUND EA 15: CPT | Performed by: PHYSICAL THERAPIST

## 2020-05-28 PROCEDURE — 97140 MANUAL THERAPY 1/> REGIONS: CPT | Performed by: PHYSICAL THERAPIST

## 2020-05-28 PROCEDURE — G0283 ELEC STIM OTHER THAN WOUND: HCPCS | Performed by: PHYSICAL THERAPIST

## 2020-05-28 PROCEDURE — 97014 ELECTRIC STIMULATION THERAPY: CPT | Performed by: PHYSICAL THERAPIST

## 2020-05-29 DIAGNOSIS — Z20.822 ENCOUNTER FOR LABORATORY TESTING FOR COVID-19 VIRUS: ICD-10-CM

## 2020-05-29 PROCEDURE — U0003 INFECTIOUS AGENT DETECTION BY NUCLEIC ACID (DNA OR RNA); SEVERE ACUTE RESPIRATORY SYNDROME CORONAVIRUS 2 (SARS-COV-2) (CORONAVIRUS DISEASE [COVID-19]), AMPLIFIED PROBE TECHNIQUE, MAKING USE OF HIGH THROUGHPUT TECHNOLOGIES AS DESCRIBED BY CMS-2020-01-R: HCPCS

## 2020-06-01 ENCOUNTER — OFFICE VISIT (OUTPATIENT)
Dept: PHYSICAL THERAPY | Facility: CLINIC | Age: 55
End: 2020-06-01
Payer: COMMERCIAL

## 2020-06-01 DIAGNOSIS — M76.821 POSTERIOR TIBIAL TENDONITIS, RIGHT: Primary | ICD-10-CM

## 2020-06-01 DIAGNOSIS — M21.41 PES PLANUS OF RIGHT FOOT: ICD-10-CM

## 2020-06-01 PROCEDURE — 97140 MANUAL THERAPY 1/> REGIONS: CPT | Performed by: PHYSICAL THERAPIST

## 2020-06-01 PROCEDURE — 97110 THERAPEUTIC EXERCISES: CPT | Performed by: PHYSICAL THERAPIST

## 2020-06-01 PROCEDURE — 97035 APP MDLTY 1+ULTRASOUND EA 15: CPT | Performed by: PHYSICAL THERAPIST

## 2020-06-02 ENCOUNTER — OFFICE VISIT (OUTPATIENT)
Dept: PHYSICAL THERAPY | Facility: CLINIC | Age: 55
End: 2020-06-02
Payer: COMMERCIAL

## 2020-06-02 DIAGNOSIS — M21.41 PES PLANUS OF RIGHT FOOT: ICD-10-CM

## 2020-06-02 DIAGNOSIS — M76.821 POSTERIOR TIBIAL TENDONITIS, RIGHT: Primary | ICD-10-CM

## 2020-06-02 LAB — SARS-COV-2 RNA SPEC QL NAA+PROBE: NOT DETECTED

## 2020-06-02 PROCEDURE — 97035 APP MDLTY 1+ULTRASOUND EA 15: CPT | Performed by: PHYSICAL THERAPIST

## 2020-06-02 PROCEDURE — 97140 MANUAL THERAPY 1/> REGIONS: CPT | Performed by: PHYSICAL THERAPIST

## 2020-06-02 PROCEDURE — 97110 THERAPEUTIC EXERCISES: CPT | Performed by: PHYSICAL THERAPIST

## 2020-06-03 ENCOUNTER — OFFICE VISIT (OUTPATIENT)
Dept: PHYSICAL THERAPY | Facility: CLINIC | Age: 55
End: 2020-06-03
Payer: COMMERCIAL

## 2020-06-03 DIAGNOSIS — M76.821 POSTERIOR TIBIAL TENDONITIS, RIGHT: Primary | ICD-10-CM

## 2020-06-03 DIAGNOSIS — M21.41 PES PLANUS OF RIGHT FOOT: ICD-10-CM

## 2020-06-03 PROCEDURE — 97110 THERAPEUTIC EXERCISES: CPT | Performed by: PHYSICAL THERAPIST

## 2020-06-03 PROCEDURE — 97035 APP MDLTY 1+ULTRASOUND EA 15: CPT | Performed by: PHYSICAL THERAPIST

## 2020-06-03 PROCEDURE — 97140 MANUAL THERAPY 1/> REGIONS: CPT | Performed by: PHYSICAL THERAPIST

## 2020-06-04 ENCOUNTER — HOSPITAL ENCOUNTER (OUTPATIENT)
Dept: GASTROENTEROLOGY | Facility: MEDICAL CENTER | Age: 55
Setting detail: OUTPATIENT SURGERY
Discharge: HOME | End: 2020-06-04
Attending: INTERNAL MEDICINE | Admitting: INTERNAL MEDICINE
Payer: COMMERCIAL

## 2020-06-04 ENCOUNTER — ANESTHESIA (OUTPATIENT)
Dept: GASTROENTEROLOGY | Facility: MEDICAL CENTER | Age: 55
End: 2020-06-04

## 2020-06-04 ENCOUNTER — NURSE TRIAGE (OUTPATIENT)
Dept: OTHER | Facility: OTHER | Age: 55
End: 2020-06-04

## 2020-06-04 VITALS
BODY MASS INDEX: 23.77 KG/M2 | RESPIRATION RATE: 19 BRPM | SYSTOLIC BLOOD PRESSURE: 132 MMHG | WEIGHT: 166 LBS | HEART RATE: 40 BPM | TEMPERATURE: 98.4 F | DIASTOLIC BLOOD PRESSURE: 73 MMHG | OXYGEN SATURATION: 98 % | HEIGHT: 70 IN

## 2020-06-04 DIAGNOSIS — K20.0 EOSINOPHILIC ESOPHAGITIS: ICD-10-CM

## 2020-06-04 DIAGNOSIS — Z20.828 EXPOSURE TO SARS-ASSOCIATED CORONAVIRUS: Primary | ICD-10-CM

## 2020-06-04 DIAGNOSIS — Z20.828 EXPOSURE TO SARS-ASSOCIATED CORONAVIRUS: ICD-10-CM

## 2020-06-04 PROCEDURE — 88305 TISSUE EXAM BY PATHOLOGIST: CPT | Performed by: PATHOLOGY

## 2020-06-04 PROCEDURE — 43239 EGD BIOPSY SINGLE/MULTIPLE: CPT | Performed by: INTERNAL MEDICINE

## 2020-06-04 RX ORDER — SODIUM CHLORIDE 9 MG/ML
125 INJECTION, SOLUTION INTRAVENOUS CONTINUOUS
Status: DISCONTINUED | OUTPATIENT
Start: 2020-06-04 | End: 2020-06-08 | Stop reason: HOSPADM

## 2020-06-04 RX ORDER — PROPOFOL 10 MG/ML
INJECTION, EMULSION INTRAVENOUS AS NEEDED
Status: DISCONTINUED | OUTPATIENT
Start: 2020-06-04 | End: 2020-06-04 | Stop reason: SURG

## 2020-06-04 RX ADMIN — PROPOFOL 200 MG: 10 INJECTION, EMULSION INTRAVENOUS at 07:56

## 2020-06-04 RX ADMIN — PROPOFOL 100 MG: 10 INJECTION, EMULSION INTRAVENOUS at 08:01

## 2020-06-04 RX ADMIN — SODIUM CHLORIDE 125 ML/HR: 0.9 INJECTION, SOLUTION INTRAVENOUS at 07:32

## 2020-06-04 RX ADMIN — PROPOFOL 100 MG: 10 INJECTION, EMULSION INTRAVENOUS at 07:59

## 2020-06-05 PROCEDURE — U0003 INFECTIOUS AGENT DETECTION BY NUCLEIC ACID (DNA OR RNA); SEVERE ACUTE RESPIRATORY SYNDROME CORONAVIRUS 2 (SARS-COV-2) (CORONAVIRUS DISEASE [COVID-19]), AMPLIFIED PROBE TECHNIQUE, MAKING USE OF HIGH THROUGHPUT TECHNOLOGIES AS DESCRIBED BY CMS-2020-01-R: HCPCS

## 2020-06-07 LAB — SARS-COV-2 RNA SPEC QL NAA+PROBE: NOT DETECTED

## 2020-06-08 ENCOUNTER — APPOINTMENT (OUTPATIENT)
Dept: PHYSICAL THERAPY | Facility: CLINIC | Age: 55
End: 2020-06-08
Payer: COMMERCIAL

## 2020-06-09 ENCOUNTER — OFFICE VISIT (OUTPATIENT)
Dept: PHYSICAL THERAPY | Facility: CLINIC | Age: 55
End: 2020-06-09
Payer: COMMERCIAL

## 2020-06-09 DIAGNOSIS — M21.41 PES PLANUS OF RIGHT FOOT: ICD-10-CM

## 2020-06-09 DIAGNOSIS — M76.821 POSTERIOR TIBIAL TENDONITIS, RIGHT: Primary | ICD-10-CM

## 2020-06-09 PROCEDURE — 97110 THERAPEUTIC EXERCISES: CPT | Performed by: PHYSICAL THERAPIST

## 2020-06-09 PROCEDURE — 97035 APP MDLTY 1+ULTRASOUND EA 15: CPT | Performed by: PHYSICAL THERAPIST

## 2020-06-09 PROCEDURE — 97140 MANUAL THERAPY 1/> REGIONS: CPT | Performed by: PHYSICAL THERAPIST

## 2020-06-10 ENCOUNTER — APPOINTMENT (OUTPATIENT)
Dept: PHYSICAL THERAPY | Facility: CLINIC | Age: 55
End: 2020-06-10
Payer: COMMERCIAL

## 2020-06-11 ENCOUNTER — OFFICE VISIT (OUTPATIENT)
Dept: PHYSICAL THERAPY | Facility: CLINIC | Age: 55
End: 2020-06-11
Payer: COMMERCIAL

## 2020-06-11 DIAGNOSIS — M76.821 POSTERIOR TIBIAL TENDONITIS, RIGHT: Primary | ICD-10-CM

## 2020-06-11 DIAGNOSIS — M21.41 PES PLANUS OF RIGHT FOOT: ICD-10-CM

## 2020-06-11 PROCEDURE — 97530 THERAPEUTIC ACTIVITIES: CPT

## 2020-06-11 PROCEDURE — 97140 MANUAL THERAPY 1/> REGIONS: CPT

## 2020-06-15 ENCOUNTER — OFFICE VISIT (OUTPATIENT)
Dept: PHYSICAL THERAPY | Facility: CLINIC | Age: 55
End: 2020-06-15
Payer: COMMERCIAL

## 2020-06-15 DIAGNOSIS — M76.821 POSTERIOR TIBIAL TENDONITIS, RIGHT: Primary | ICD-10-CM

## 2020-06-15 PROCEDURE — 97140 MANUAL THERAPY 1/> REGIONS: CPT | Performed by: PHYSICAL THERAPIST

## 2020-06-16 ENCOUNTER — APPOINTMENT (OUTPATIENT)
Dept: PHYSICAL THERAPY | Facility: CLINIC | Age: 55
End: 2020-06-16
Payer: COMMERCIAL

## 2020-06-18 ENCOUNTER — APPOINTMENT (OUTPATIENT)
Dept: PHYSICAL THERAPY | Facility: CLINIC | Age: 55
End: 2020-06-18
Payer: COMMERCIAL

## 2020-06-23 ENCOUNTER — OFFICE VISIT (OUTPATIENT)
Dept: PHYSICAL THERAPY | Facility: CLINIC | Age: 55
End: 2020-06-23
Payer: COMMERCIAL

## 2020-06-23 DIAGNOSIS — M76.821 POSTERIOR TIBIAL TENDONITIS, RIGHT: Primary | ICD-10-CM

## 2020-06-23 DIAGNOSIS — M21.41 PES PLANUS OF RIGHT FOOT: ICD-10-CM

## 2020-06-23 PROCEDURE — 97110 THERAPEUTIC EXERCISES: CPT | Performed by: PHYSICAL THERAPIST

## 2020-06-23 PROCEDURE — 97140 MANUAL THERAPY 1/> REGIONS: CPT | Performed by: PHYSICAL THERAPIST

## 2020-06-23 PROCEDURE — 97035 APP MDLTY 1+ULTRASOUND EA 15: CPT | Performed by: PHYSICAL THERAPIST

## 2020-06-24 ENCOUNTER — OFFICE VISIT (OUTPATIENT)
Dept: PODIATRY | Facility: CLINIC | Age: 55
End: 2020-06-24
Payer: COMMERCIAL

## 2020-06-24 VITALS
SYSTOLIC BLOOD PRESSURE: 143 MMHG | HEART RATE: 42 BPM | WEIGHT: 164 LBS | BODY MASS INDEX: 23.48 KG/M2 | HEIGHT: 70 IN | DIASTOLIC BLOOD PRESSURE: 87 MMHG

## 2020-06-24 DIAGNOSIS — M21.41 PES PLANUS OF RIGHT FOOT: ICD-10-CM

## 2020-06-24 DIAGNOSIS — M76.821 POSTERIOR TIBIAL TENDONITIS, RIGHT: Primary | ICD-10-CM

## 2020-06-24 PROCEDURE — 99213 OFFICE O/P EST LOW 20 MIN: CPT | Performed by: PODIATRIST

## 2020-06-24 PROCEDURE — 1036F TOBACCO NON-USER: CPT | Performed by: PODIATRIST

## 2020-06-24 PROCEDURE — 3008F BODY MASS INDEX DOCD: CPT | Performed by: PODIATRIST

## 2020-06-25 ENCOUNTER — OFFICE VISIT (OUTPATIENT)
Dept: PHYSICAL THERAPY | Facility: CLINIC | Age: 55
End: 2020-06-25
Payer: COMMERCIAL

## 2020-06-25 DIAGNOSIS — M76.821 POSTERIOR TIBIAL TENDONITIS, RIGHT: Primary | ICD-10-CM

## 2020-06-25 DIAGNOSIS — M21.41 PES PLANUS OF RIGHT FOOT: ICD-10-CM

## 2020-06-25 PROCEDURE — 97140 MANUAL THERAPY 1/> REGIONS: CPT | Performed by: PHYSICAL THERAPIST

## 2020-06-25 PROCEDURE — 97110 THERAPEUTIC EXERCISES: CPT | Performed by: PHYSICAL THERAPIST

## 2020-06-25 PROCEDURE — 97112 NEUROMUSCULAR REEDUCATION: CPT | Performed by: PHYSICAL THERAPIST

## 2020-06-29 ENCOUNTER — OFFICE VISIT (OUTPATIENT)
Dept: PHYSICAL THERAPY | Facility: CLINIC | Age: 55
End: 2020-06-29
Payer: COMMERCIAL

## 2020-06-29 DIAGNOSIS — M76.821 POSTERIOR TIBIAL TENDONITIS, RIGHT: Primary | ICD-10-CM

## 2020-06-29 PROCEDURE — 97110 THERAPEUTIC EXERCISES: CPT | Performed by: PHYSICAL THERAPIST

## 2020-06-29 PROCEDURE — 97140 MANUAL THERAPY 1/> REGIONS: CPT | Performed by: PHYSICAL THERAPIST

## 2020-07-07 ENCOUNTER — EVALUATION (OUTPATIENT)
Dept: PHYSICAL THERAPY | Facility: CLINIC | Age: 55
End: 2020-07-07
Payer: COMMERCIAL

## 2020-07-07 DIAGNOSIS — M76.821 POSTERIOR TIBIAL TENDONITIS, RIGHT: Primary | ICD-10-CM

## 2020-07-07 DIAGNOSIS — M21.41 PES PLANUS OF RIGHT FOOT: ICD-10-CM

## 2020-07-07 PROCEDURE — 97112 NEUROMUSCULAR REEDUCATION: CPT | Performed by: PHYSICAL THERAPIST

## 2020-07-07 PROCEDURE — 97110 THERAPEUTIC EXERCISES: CPT | Performed by: PHYSICAL THERAPIST

## 2020-07-07 PROCEDURE — 97140 MANUAL THERAPY 1/> REGIONS: CPT | Performed by: PHYSICAL THERAPIST

## 2020-07-07 NOTE — PROGRESS NOTES
Daily Note     Today's date: 2020  Patient name: Barbara Lozano  : 1965  MRN: [id number]  Referring provider: JOSUE Gant  Dx:   Encounter Diagnosis     ICD-10-CM    1  Posterior tibial tendonitis, right M76 821    2  Pes planus of right foot M21 41                   Subjective: occasional stinging pain but overall improving      Objective: See treatment diary below      Assessment: Tolerated treatment well  Patient would benefit from continued PT      Plan: Continue per plan of care        Precautions: none      Manuals      Laser max  5' 5' 5'    US 3 mhz 1 5  8' 8'     ktape PTT   KT declined     jt mobs/PROM  mb md     GT   md nieto    There-ex        AROM        Calf stretch         Inv isomet 3 angles         Short foot    review    Toe splay        bike        tband inv  CC G x30 CC G x30 CCblu 50     Heel raises  Up both, dwn R Up both, dwn R x30  up both dwn R 30    E-stim        Wobble board  30 cw, ccw CW/CCW x30 ea 30 ea    Squats barefoot    30                                            Ther Activity                        Gait Training                        Modalities

## 2020-07-09 ENCOUNTER — OFFICE VISIT (OUTPATIENT)
Dept: PHYSICAL THERAPY | Facility: CLINIC | Age: 55
End: 2020-07-09
Payer: COMMERCIAL

## 2020-07-09 DIAGNOSIS — M21.41 PES PLANUS OF RIGHT FOOT: ICD-10-CM

## 2020-07-09 DIAGNOSIS — M76.821 POSTERIOR TIBIAL TENDONITIS, RIGHT: Primary | ICD-10-CM

## 2020-07-09 PROCEDURE — 97140 MANUAL THERAPY 1/> REGIONS: CPT | Performed by: PHYSICAL THERAPIST

## 2020-07-09 PROCEDURE — 97110 THERAPEUTIC EXERCISES: CPT | Performed by: PHYSICAL THERAPIST

## 2020-07-09 PROCEDURE — 97112 NEUROMUSCULAR REEDUCATION: CPT | Performed by: PHYSICAL THERAPIST

## 2020-07-09 NOTE — PROGRESS NOTES
Daily Note     Today's date: 2020  Patient name: Hillary Kraus  : 1965  MRN: [id number]  Referring provider: JOSUE Em  Dx:   Encounter Diagnosis     ICD-10-CM    1  Posterior tibial tendonitis, right M76 821    2  Pes planus of right foot M21 41                   Subjective: overall progress  Pain at end range inversion    Objective: See treatment diary below  Noted L ankle instability with supination walk    Assessment: Tolerated treatment well  Patient would benefit from continued PT      Plan: Continue per plan of care        Precautions: none      Manuals     Laser max  5' 5' 5' 5'   US 3 mhz 1 5  8' 8'     ktape PTT   KT declined     jt mobs/PROM  gerson nieto   GT   md gerson nieto   There-ex        AROM        Calf stretch      30"x2   Inv isomet 3 angles         Short foot    review    Toe splay        bike        tband inv  CC G x30 CC G x30 CCblu 50  bridget 30   Heel raises  Up both, dwn R Up both, dwn R x30  up both dwn R 30 30   E-stim        Wobble board  30 cw, ccw CW/CCW x30 ea 30 ea 30   Squats barefoot    30 30   Standing core activation     10"x10   Toe walk     20'x2   Supination walk     20'x2   SLB     IP           Ther Activity                        Gait Training                        Modalities

## 2020-07-14 ENCOUNTER — OFFICE VISIT (OUTPATIENT)
Dept: PHYSICAL THERAPY | Facility: CLINIC | Age: 55
End: 2020-07-14
Payer: COMMERCIAL

## 2020-07-14 DIAGNOSIS — M76.821 POSTERIOR TIBIAL TENDONITIS, RIGHT: Primary | ICD-10-CM

## 2020-07-14 DIAGNOSIS — M21.41 PES PLANUS OF RIGHT FOOT: ICD-10-CM

## 2020-07-14 PROCEDURE — 97140 MANUAL THERAPY 1/> REGIONS: CPT | Performed by: PHYSICAL THERAPIST

## 2020-07-14 PROCEDURE — 97110 THERAPEUTIC EXERCISES: CPT | Performed by: PHYSICAL THERAPIST

## 2020-07-14 PROCEDURE — 97112 NEUROMUSCULAR REEDUCATION: CPT | Performed by: PHYSICAL THERAPIST

## 2020-07-14 NOTE — PROGRESS NOTES
Daily Note     Today's date: 2020  Patient name: Fabiola Meadows  : 1965  MRN: [id number]  Referring provider: JOSUE Manzo  Dx:   Encounter Diagnosis     ICD-10-CM    1  Posterior tibial tendonitis, right M76 821    2  Pes planus of right foot M21 41                   Subjective: continued progress        Objective: See treatment diary below      Assessment: Tolerated treatment well  Patient demonstrated fatigue post treatment      Plan: Continue per plan of care        Precautions: none      Manuals    Laser max 5'   US 3 mhz 1 5    ktape PTT     jt mobs/PROM mb   GT mb   There-ex    AROM    Calf stretch  30"x2   Inv isomet 3 angles     Short foot    Toe splay    bike    tband inv bridget 30   Heel raises 30 up both dwn R   E-stim    Wobble board 30   Squats barefoot 30   Standing core activation 10"x10   Toe walk 25'x4   Supination walk 25'x6   SLB IP   Arch raise MRE 15   Ther Activity            Gait Training            Modalities

## 2020-07-16 ENCOUNTER — OFFICE VISIT (OUTPATIENT)
Dept: PHYSICAL THERAPY | Facility: CLINIC | Age: 55
End: 2020-07-16
Payer: COMMERCIAL

## 2020-07-16 DIAGNOSIS — M21.41 PES PLANUS OF RIGHT FOOT: ICD-10-CM

## 2020-07-16 DIAGNOSIS — M76.821 POSTERIOR TIBIAL TENDONITIS, RIGHT: Primary | ICD-10-CM

## 2020-07-16 PROCEDURE — 97140 MANUAL THERAPY 1/> REGIONS: CPT

## 2020-07-16 PROCEDURE — 97112 NEUROMUSCULAR REEDUCATION: CPT

## 2020-07-16 PROCEDURE — 97110 THERAPEUTIC EXERCISES: CPT

## 2020-07-16 NOTE — PROGRESS NOTES
Daily Note     Today's date: 2020  Patient name: Bri Borja  : 1965  MRN: [id number]  Referring provider: JOSUE Ward  Dx:   Encounter Diagnosis     ICD-10-CM    1  Posterior tibial tendonitis, right M76 821    2  Pes planus of right foot M21 41                   Subjective: Pt reports improvement, cont to have occasional "tweaks" of pain  Objective: See treatment diary below      Assessment: Performed exercise program w/o complaint  Responded well to manual therapies  Tolerated treatment well  Patient would benefit from continued PT      Plan: Continue per plan of care        Precautions: none      Manuals    Laser max 4'   US 3 mhz 1 5    ktape PTT     jt mobs/PROM DL/MO   GT MO   There-ex    AROM    Calf stretch  30"x2   Inv isomet 3 angles     Short foot    Toe splay    bike    tband inv bridget 30   Heel raises 30 up both dwn R   E-stim    Wobble board 30   Squats barefoot Prior to  therapy   Standing core activation 10"x10   Toe walk 25'x4   Supination walk 25'x6   SLB IP   Arch raise MRE 15   Ther Activity            Gait Training            Modalities

## 2020-07-21 ENCOUNTER — OFFICE VISIT (OUTPATIENT)
Dept: PHYSICAL THERAPY | Facility: CLINIC | Age: 55
End: 2020-07-21
Payer: COMMERCIAL

## 2020-07-21 DIAGNOSIS — M76.821 POSTERIOR TIBIAL TENDONITIS, RIGHT: Primary | ICD-10-CM

## 2020-07-21 DIAGNOSIS — M21.41 PES PLANUS OF RIGHT FOOT: ICD-10-CM

## 2020-07-21 PROCEDURE — 97140 MANUAL THERAPY 1/> REGIONS: CPT

## 2020-07-21 PROCEDURE — 97112 NEUROMUSCULAR REEDUCATION: CPT

## 2020-07-21 NOTE — PROGRESS NOTES
Daily Note     Today's date: 2020  Patient name: Abbe Gutierrez  : 1965  MRN: [id number]  Referring provider: JOSUE Boo  Dx:   Encounter Diagnosis     ICD-10-CM    1  Posterior tibial tendonitis, right M76 821    2  Pes planus of right foot M21 41                   Subjective: Pt reports performing all of his exercises prior to PT today  He notes that he is noticing slow but steady healing  He notes localized "twinge" of pain in the arch of the foot (none in the muscle belly of posterior tib) when attempting to go down stairs "normally"      Objective: See treatment diary below      Assessment: Pt provided reinforcement of cues to perform short foot/arch making exercise  As pt performed all therapeutic exercises prior to PT, manual therapy focused today  Pt will benefit from continued skilled PT to progress toward goals  Plan: Continue per plan of care        Precautions: none      Manuals    Laser max 4'   US 3 mhz 1 5    ktape PTT     jt mobs/PROM DL   GT DL   There-ex    AROM    Calf stretch  Prior to PT   Inv isomet 3 angles     Short foot VC/TC 10' demo   Toe splay    bike    tband inv    Heel raises    E-stim    Wobble board    Squats barefoot Prior to  therapy   Standing core activation Prior to PT   Toe walk Prior to PT   Supination walk Prior to PT   SLB IP   Arch raise MRE Prior to PT   Ther Activity            Gait Training            Modalities

## 2020-07-23 ENCOUNTER — OFFICE VISIT (OUTPATIENT)
Dept: PHYSICAL THERAPY | Facility: CLINIC | Age: 55
End: 2020-07-23
Payer: COMMERCIAL

## 2020-07-23 DIAGNOSIS — M76.821 POSTERIOR TIBIAL TENDONITIS, RIGHT: Primary | ICD-10-CM

## 2020-07-23 PROCEDURE — 97140 MANUAL THERAPY 1/> REGIONS: CPT | Performed by: PHYSICAL THERAPIST

## 2020-07-23 NOTE — PROGRESS NOTES
Daily Note     Today's date: 2020  Patient name: Ruby Norwood  : 1965  MRN: [id number]  Referring provider: JOSUE Elder  Dx:   Encounter Diagnosis     ICD-10-CM    1  Posterior tibial tendonitis, right M76 821                   Subjective: Pt reports he has soreness later that day or the next day after performing his exercises  Objective: See treatment diary below      Assessment: Tolerated treatment well  Patient would benefit from continued PT      Plan: Continue per plan of care        Precautions: none      Manuals    Laser max 4' 4'   US 3 mhz 1 5     ktape PTT      jt mobs/PROM DL KT   GT DL KT   There-ex     AROM     Calf stretch  Prior to PT Prior to PT   Inv isomet 3 angles      Short foot VC/TC 10' demo    Toe splay     bike     tband inv     Heel raises     E-stim     Wobble board     Squats barefoot Prior to  therapy Prior to PT   Standing core activation Prior to PT Prior to PT   Toe walk Prior to PT Prior to PT   Supination walk Prior to PT Prior to PT   SLB IP    Arch raise MRE Prior to PT Prior to PT   Ther Activity               Gait Training               Modalities

## 2020-07-28 ENCOUNTER — OFFICE VISIT (OUTPATIENT)
Dept: PHYSICAL THERAPY | Facility: CLINIC | Age: 55
End: 2020-07-28
Payer: COMMERCIAL

## 2020-07-28 DIAGNOSIS — M76.821 POSTERIOR TIBIAL TENDONITIS, RIGHT: Primary | ICD-10-CM

## 2020-07-28 PROCEDURE — 97140 MANUAL THERAPY 1/> REGIONS: CPT | Performed by: PHYSICAL THERAPIST

## 2020-07-28 NOTE — PROGRESS NOTES
Daily Note     Today's date: 2020  Patient name: Ariela Tapia  : 1965  MRN: [id number]  Referring provider: JOSUE Roberson  Dx:   Encounter Diagnosis     ICD-10-CM    1  Posterior tibial tendonitis, right M76 821                   Subjective: Pt reports he's been going very gentle with the exercises lately and thinks it's helping  He's avoiding eccentrics and stretching and it seems to be helping  Objective: See treatment diary below      Assessment: Tolerated treatment well  Patient would benefit from continued PT  No complaints with manual treatment  Pt declined performing all exercises  Plan: Continue per plan of care        Precautions: none      Manuals    Laser max 4' 4' 4'   US 3 mhz 1 5      ktape PTT       jt mobs/PROM DL KT KT   GT DL KT KT   There-ex      AROM      Calf stretch  Prior to PT Prior to PT    Inv isomet 3 angles       Short foot VC/TC 10' demo     Toe splay      bike      tband inv      Heel raises      E-stim      Wobble board      Squats barefoot Prior to  therapy Prior to PT    Standing core activation Prior to PT Prior to PT    Toe walk Prior to PT Prior to PT    Supination walk Prior to PT Prior to PT    SLB IP     Arch raise MRE Prior to PT Prior to PT    Ther Activity                  Gait Training                  Modalities

## 2020-08-18 ENCOUNTER — OFFICE VISIT (OUTPATIENT)
Dept: PHYSICAL THERAPY | Facility: CLINIC | Age: 55
End: 2020-08-18
Payer: COMMERCIAL

## 2020-08-18 DIAGNOSIS — M76.821 POSTERIOR TIBIAL TENDONITIS, RIGHT: Primary | ICD-10-CM

## 2020-08-18 DIAGNOSIS — M21.41 PES PLANUS OF RIGHT FOOT: ICD-10-CM

## 2020-08-18 PROCEDURE — 97140 MANUAL THERAPY 1/> REGIONS: CPT | Performed by: PHYSICAL THERAPIST

## 2020-08-18 PROCEDURE — 97110 THERAPEUTIC EXERCISES: CPT | Performed by: PHYSICAL THERAPIST

## 2020-08-18 NOTE — PROGRESS NOTES
Daily Note     Today's date: 2020  Patient name: Ariela Tapia  : 1965  MRN: [id number]  Referring provider: JOSUE Roberson  Dx:   Encounter Diagnosis     ICD-10-CM    1  Posterior tibial tendonitis, right  M76 821    2  Pes planus of right foot  M21 41                   Subjective: feeling better with [employer] Brace  Pain at worst: 6/10 but only occasionally and is brief/transient      Objective: See treatment diary below      Assessment: Tolerated treatment well  Patient demonstrated fatigue post treatment      Plan: Continue per plan of care        Precautions: none      Manuals    Laser max 4' 4' 4' 4'   US 3 mhz 1 5       ktape PTT        jt mobs/PROM DL KT KT mb   GT DL KT KT    There-ex       AROM       Calf stretch  Prior to PT Prior to PT     Inv isomet 3 angles     15   Short foot VC/TC 10' demo      Toe splay       bike       tband inv    review   Heel raises       E-stim       Wobble board    Balance x3   Squats barefoot Prior to  therapy Prior to PT     Standing core activation Prior to PT Prior to PT     Toe walk Prior to PT Prior to PT     Supination walk Prior to PT Prior to PT     SLB IP      Arch raise MRE Prior to PT Prior to PT     Lunge onto air disc    Red 30 ea                 Gait Training                     Modalities

## 2020-08-25 ENCOUNTER — OFFICE VISIT (OUTPATIENT)
Dept: PHYSICAL THERAPY | Facility: CLINIC | Age: 55
End: 2020-08-25
Payer: COMMERCIAL

## 2020-08-25 DIAGNOSIS — M76.821 POSTERIOR TIBIAL TENDONITIS, RIGHT: Primary | ICD-10-CM

## 2020-08-25 DIAGNOSIS — M21.41 PES PLANUS OF RIGHT FOOT: ICD-10-CM

## 2020-08-25 PROCEDURE — 97110 THERAPEUTIC EXERCISES: CPT

## 2020-08-25 PROCEDURE — 97140 MANUAL THERAPY 1/> REGIONS: CPT

## 2020-08-25 NOTE — PROGRESS NOTES
Daily Note     Today's date: 2020  Patient name: Christopher Szymanski  : 1965  MRN: [id number]  Referring provider: JOSUE Banuelos  Dx:   Encounter Diagnosis     ICD-10-CM    1  Posterior tibial tendonitis, right  M76 821    2  Pes planus of right foot  M21 41                   Subjective: Pt reports that he is doing okay today noting that he has been walking and sometimes jogging with no brace on  Notes that he currently is in no pain but does notice it when he points his foot inwards  Objective: See treatment diary below      Assessment: Increased pain present at end range inversion only at times when ankle was fully dorsiflexed  He was able to perform all other exercises asked of him with no complaints  Ankle fatigue present to end  Tolerated treatment well  Patient demonstrated fatigue post treatment      Plan: Continue per plan of care        Precautions: none      Manuals    Laser max 4' 4' 4' 4' 5'   US 3 mhz 1 5        ktape PTT         jt mobs/PROM DL KT KT mb MM prom   GT DL KT KT     There-ex        AROM        Calf stretch  Prior to PT Prior to PT      Inv isomet 3 angles     15 15   Short foot VC/TC 10' demo       Toe splay        bike        tband inv    review    Heel raises     2U 1D steps 20x   E-stim        Wobble board    Balance x3 Balance x3   Squats barefoot Prior to  therapy Prior to PT      Standing core activation Prior to PT Prior to PT      Toe walk Prior to PT Prior to PT      Supination walk Prior to PT Prior to PT      SLB IP       Arch raise MRE Prior to PT Prior to PT      Lunge onto air disc    Red 30 ea Red 30 ea                   Gait Training                        Modalities

## 2020-09-01 ENCOUNTER — OFFICE VISIT (OUTPATIENT)
Dept: PHYSICAL THERAPY | Facility: CLINIC | Age: 55
End: 2020-09-01
Payer: COMMERCIAL

## 2020-09-01 DIAGNOSIS — M76.821 POSTERIOR TIBIAL TENDONITIS, RIGHT: Primary | ICD-10-CM

## 2020-09-01 DIAGNOSIS — M21.41 PES PLANUS OF RIGHT FOOT: ICD-10-CM

## 2020-09-01 PROCEDURE — 97164 PT RE-EVAL EST PLAN CARE: CPT | Performed by: PHYSICAL THERAPIST

## 2020-09-01 NOTE — PROGRESS NOTES
DC summary     Today's date: 2020  Patient name: Breana Maldonado  : 1965  MRN: [id number]  Referring provider: JOSUE Reed  Dx:   Encounter Diagnosis     ICD-10-CM    1  Posterior tibial tendonitis, right  M76 821    2  Pes planus of right foot  M21 41                    Subjective:   Overall progress  Generally pain is 0/10 with occasional "twinges"      Objective: See treatment diary below  Full squat  Single leg heel raise 10 reps approx 80% full ROM  Inversion MMT 5/5  PROM wfl, pain end rang inversion      Assessment: Tolerated treatment well   Patient is ready for dC to HEP      Plan: DC to [facility]     Precautions: none      Manuals    Laser max 4' 4' 4' 4' 5' 4'   US 3 mhz 1 5         ktape PTT          jt mobs/PROM DL KT KT mb MM prom mb   GT DL KT KT      There-ex         AROM         Calf stretch  Prior to PT Prior to PT       Inv isomet 3 angles     15 15    Short foot VC/TC 10' demo        Toe splay         bike         tband inv    review     Heel raises     2U 1D steps 20x Single 10   E-stim         Wobble board    Balance x3 Balance x3    Squats barefoot Prior to  therapy Prior to PT       Standing core activation Prior to PT Prior to PT       Toe walk Prior to PT Prior to PT       Supination walk Prior to PT Prior to PT       SLB IP        Arch raise MRE Prior to PT Prior to PT       Lunge onto air disc    Red 30 ea Red 30 ea                      Gait Training                           Modalities

## 2020-09-02 ENCOUNTER — OFFICE VISIT (OUTPATIENT)
Dept: PODIATRY | Facility: CLINIC | Age: 55
End: 2020-09-02

## 2020-09-02 VITALS
DIASTOLIC BLOOD PRESSURE: 75 MMHG | HEART RATE: 40 BPM | WEIGHT: 164 LBS | HEIGHT: 71 IN | SYSTOLIC BLOOD PRESSURE: 125 MMHG | TEMPERATURE: 97.8 F | BODY MASS INDEX: 22.96 KG/M2

## 2020-09-02 DIAGNOSIS — M76.821 POSTERIOR TIBIAL TENDONITIS, RIGHT: Primary | ICD-10-CM

## 2020-09-02 DIAGNOSIS — M21.41 PES PLANUS OF RIGHT FOOT: ICD-10-CM

## 2020-09-02 PROCEDURE — 99213 OFFICE O/P EST LOW 20 MIN: CPT | Performed by: PODIATRIST

## 2020-09-02 NOTE — PROGRESS NOTES
Assessment/Plan:     Diagnoses and all orders for this visit:    Posterior tibial tendonitis, right  Comments:  Stage 1 PTTD  Formal therapy has done well  COntinue home therapy for 2 more months  HIWOTO fits well  Hopefully in 3 months can transiton from brace  Pes planus of right foot      I reviewed patient's physical therapy notes  He has shown significant improvement and able to perform single heel raise  He is slowly introducing more exercise to the lower extremity with and without the brace  I discussed the necessity to go slowly with his progress and would encourage him to use the brace for 2 more months  Subjective:      Patient ID: Bri Borja is a 49 y o  male  PAtient has been in PT for PTTD on right  He did some light jogging which went well but the brace rubbed a little area of his ankle  He states he has seen significant improvement in physical therapy  He was recently discharged from PT and feels he can perform these exercises at home  Overall he is pleased with the progress he is seen  The following portions of the patient's history were reviewed and updated as appropriate: allergies, current medications, past family history, past medical history, past social history, past surgical history and problem list     Review of Systems   Constitutional: Negative  HENT: Negative for rhinorrhea and sinus pain  Respiratory: Negative for cough and shortness of breath  Cardiovascular: Negative for leg swelling  Gastrointestinal: Negative for diarrhea and nausea  Musculoskeletal: Positive for arthralgias and myalgias  Skin: Negative for color change, rash and wound  Neurological: Negative for numbness           Objective:      /75   Pulse (!) 40   Temp 97 8 °F (36 6 °C)   Ht 5' 11" (1 803 m) Comment: verbal  Wt 74 4 kg (164 lb) Comment: verbal, scale is not working  BMI 22 87 kg/m²          Physical Exam    Vitals reviewed    Constitutional: Patient is not distressed  Patient is well developed  Patient is not obese  Vascular: Dorsalis pedis and posterior tibial pulses palpable  Capillary refill time within normal limits to all digits  No erythema  No edema  No significant varicosities  Dermatology: No rash  No open lesions  Present pedal hair  Skin has healthy turgor  Musculoskeletal: Normal range of motion to ankle, subtalar joint, and midtarsal joint  Normal range of motion first MTPJ  Manual muscle testing 5 out of 5 for inversion/eversion/dorsiflexion/plantarflexion  On stance patients feet are generally rectus  Patient is able to perform single heel raise on the right without any medial ankle pain  Normal inversion noted  Some minor tenderness at the insertion of posterior tibial tendon at the navicula  Neurological: Monofilament sensation is intact  Vibratory sensation is intact     Achilles reflex is normal    Proprioception is normal

## 2020-11-04 ENCOUNTER — OFFICE VISIT (OUTPATIENT)
Dept: PODIATRY | Facility: CLINIC | Age: 55
End: 2020-11-04
Payer: COMMERCIAL

## 2020-11-04 VITALS
HEIGHT: 71 IN | DIASTOLIC BLOOD PRESSURE: 75 MMHG | HEART RATE: 48 BPM | BODY MASS INDEX: 22.9 KG/M2 | WEIGHT: 163.6 LBS | SYSTOLIC BLOOD PRESSURE: 134 MMHG | TEMPERATURE: 97.9 F

## 2020-11-04 DIAGNOSIS — M76.821 POSTERIOR TIBIAL TENDONITIS, RIGHT: Primary | ICD-10-CM

## 2020-11-04 PROCEDURE — 99213 OFFICE O/P EST LOW 20 MIN: CPT | Performed by: PODIATRIST

## 2020-11-15 ENCOUNTER — HOSPITAL ENCOUNTER (OUTPATIENT)
Dept: RADIOLOGY | Facility: HOSPITAL | Age: 55
Discharge: HOME | End: 2020-11-15
Attending: PODIATRIST
Payer: COMMERCIAL

## 2020-11-15 DIAGNOSIS — M76.821 POSTERIOR TIBIAL TENDONITIS, RIGHT: ICD-10-CM

## 2020-11-15 PROCEDURE — 73721 MRI JNT OF LWR EXTRE W/O DYE: CPT

## 2020-11-15 PROCEDURE — G1004 CDSM NDSC: HCPCS

## 2020-11-25 ENCOUNTER — OFFICE VISIT (OUTPATIENT)
Dept: PODIATRY | Facility: CLINIC | Age: 55
End: 2020-11-25
Payer: COMMERCIAL

## 2020-11-25 VITALS
WEIGHT: 169 LBS | BODY MASS INDEX: 23.66 KG/M2 | DIASTOLIC BLOOD PRESSURE: 75 MMHG | SYSTOLIC BLOOD PRESSURE: 130 MMHG | HEART RATE: 47 BPM | HEIGHT: 71 IN

## 2020-11-25 DIAGNOSIS — M87.876: Primary | ICD-10-CM

## 2020-11-25 DIAGNOSIS — M76.821 POSTERIOR TIBIAL TENDONITIS, RIGHT: ICD-10-CM

## 2020-11-25 PROCEDURE — 99214 OFFICE O/P EST MOD 30 MIN: CPT | Performed by: PODIATRIST

## 2020-12-07 ENCOUNTER — TELEPHONE (OUTPATIENT)
Dept: FAMILY MEDICINE CLINIC | Facility: CLINIC | Age: 55
End: 2020-12-07

## 2020-12-09 ENCOUNTER — OFFICE VISIT (OUTPATIENT)
Dept: PODIATRY | Facility: CLINIC | Age: 55
End: 2020-12-09
Payer: COMMERCIAL

## 2020-12-09 ENCOUNTER — CONSULT (OUTPATIENT)
Dept: FAMILY MEDICINE CLINIC | Facility: CLINIC | Age: 55
End: 2020-12-09
Payer: COMMERCIAL

## 2020-12-09 VITALS
DIASTOLIC BLOOD PRESSURE: 74 MMHG | SYSTOLIC BLOOD PRESSURE: 102 MMHG | OXYGEN SATURATION: 99 % | HEIGHT: 71 IN | WEIGHT: 163.9 LBS | BODY MASS INDEX: 22.94 KG/M2 | HEART RATE: 56 BPM | TEMPERATURE: 97.8 F | RESPIRATION RATE: 16 BRPM

## 2020-12-09 VITALS
HEART RATE: 41 BPM | SYSTOLIC BLOOD PRESSURE: 127 MMHG | WEIGHT: 166 LBS | BODY MASS INDEX: 23.24 KG/M2 | HEIGHT: 71 IN | DIASTOLIC BLOOD PRESSURE: 71 MMHG

## 2020-12-09 DIAGNOSIS — Z01.818 PREPROCEDURAL GENERAL PHYSICAL EXAMINATION: Primary | ICD-10-CM

## 2020-12-09 DIAGNOSIS — Q74.2 ACCESSORY NAVICULAR BONE OF FOOT: Primary | ICD-10-CM

## 2020-12-09 PROCEDURE — 99214 OFFICE O/P EST MOD 30 MIN: CPT | Performed by: NURSE PRACTITIONER

## 2020-12-09 PROCEDURE — 99213 OFFICE O/P EST LOW 20 MIN: CPT | Performed by: PODIATRIST

## 2020-12-13 ENCOUNTER — PATIENT MESSAGE (OUTPATIENT)
Dept: PODIATRY | Facility: CLINIC | Age: 55
End: 2020-12-13

## 2020-12-15 ENCOUNTER — TELEPHONE (OUTPATIENT)
Dept: PODIATRY | Facility: CLINIC | Age: 55
End: 2020-12-15

## 2020-12-16 ENCOUNTER — OFFICE VISIT (OUTPATIENT)
Dept: PODIATRY | Facility: CLINIC | Age: 55
End: 2020-12-16
Payer: COMMERCIAL

## 2020-12-16 VITALS — BODY MASS INDEX: 22.68 KG/M2 | WEIGHT: 162 LBS | HEIGHT: 71 IN

## 2020-12-16 DIAGNOSIS — M76.821 POSTERIOR TIBIAL TENDONITIS, RIGHT: ICD-10-CM

## 2020-12-16 DIAGNOSIS — Q74.2 ACCESSORY NAVICULAR BONE OF FOOT: Primary | ICD-10-CM

## 2020-12-16 PROCEDURE — 99212 OFFICE O/P EST SF 10 MIN: CPT | Performed by: PODIATRIST

## 2020-12-18 ENCOUNTER — ANESTHESIA (OUTPATIENT)
Dept: PERIOP | Facility: HOSPITAL | Age: 55
End: 2020-12-18
Payer: COMMERCIAL

## 2020-12-18 ENCOUNTER — HOSPITAL ENCOUNTER (OUTPATIENT)
Facility: HOSPITAL | Age: 55
Setting detail: OUTPATIENT SURGERY
Discharge: HOME | End: 2020-12-18
Attending: PODIATRIST | Admitting: PODIATRIST
Payer: COMMERCIAL

## 2020-12-18 ENCOUNTER — ANESTHESIA EVENT (OUTPATIENT)
Dept: PERIOP | Facility: HOSPITAL | Age: 55
End: 2020-12-18
Payer: COMMERCIAL

## 2020-12-18 VITALS
SYSTOLIC BLOOD PRESSURE: 138 MMHG | HEIGHT: 71 IN | HEART RATE: 38 BPM | RESPIRATION RATE: 16 BRPM | DIASTOLIC BLOOD PRESSURE: 65 MMHG | BODY MASS INDEX: 22.6 KG/M2 | WEIGHT: 161.4 LBS | TEMPERATURE: 97.7 F | OXYGEN SATURATION: 100 %

## 2020-12-18 VITALS — HEART RATE: 51 BPM

## 2020-12-18 DIAGNOSIS — Z98.890 STATUS POST SURGERY: Primary | ICD-10-CM

## 2020-12-18 DIAGNOSIS — M87.876: ICD-10-CM

## 2020-12-18 DIAGNOSIS — M76.821 POSTERIOR TIBIAL TENDONITIS, RIGHT: ICD-10-CM

## 2020-12-18 PROCEDURE — 99024 POSTOP FOLLOW-UP VISIT: CPT | Performed by: PODIATRIST

## 2020-12-18 PROCEDURE — 88304 TISSUE EXAM BY PATHOLOGIST: CPT | Performed by: PATHOLOGY

## 2020-12-18 PROCEDURE — 28238 REVISION OF FOOT TENDON: CPT | Performed by: PODIATRIST

## 2020-12-18 PROCEDURE — C1713 ANCHOR/SCREW BN/BN,TIS/BN: HCPCS | Performed by: PODIATRIST

## 2020-12-18 PROCEDURE — 88311 DECALCIFY TISSUE: CPT | Performed by: PATHOLOGY

## 2020-12-18 DEVICE — ANCHOR SUT MINI 2.4 X 8.5MM DISP: Type: IMPLANTABLE DEVICE | Site: FOOT | Status: FUNCTIONAL

## 2020-12-18 RX ORDER — CEFAZOLIN SODIUM 1 G/50ML
1000 SOLUTION INTRAVENOUS ONCE
Status: DISCONTINUED | OUTPATIENT
Start: 2020-12-18 | End: 2020-12-18 | Stop reason: HOSPADM

## 2020-12-18 RX ORDER — FENTANYL CITRATE/PF 50 MCG/ML
25 SYRINGE (ML) INJECTION
Status: DISCONTINUED | OUTPATIENT
Start: 2020-12-18 | End: 2020-12-18 | Stop reason: HOSPADM

## 2020-12-18 RX ORDER — MIDAZOLAM HYDROCHLORIDE 2 MG/2ML
INJECTION, SOLUTION INTRAMUSCULAR; INTRAVENOUS AS NEEDED
Status: DISCONTINUED | OUTPATIENT
Start: 2020-12-18 | End: 2020-12-18

## 2020-12-18 RX ORDER — GLYCOPYRROLATE 0.2 MG/ML
INJECTION INTRAMUSCULAR; INTRAVENOUS AS NEEDED
Status: DISCONTINUED | OUTPATIENT
Start: 2020-12-18 | End: 2020-12-18

## 2020-12-18 RX ORDER — ONDANSETRON 2 MG/ML
4 INJECTION INTRAMUSCULAR; INTRAVENOUS ONCE
Status: DISCONTINUED | OUTPATIENT
Start: 2020-12-18 | End: 2020-12-18 | Stop reason: HOSPADM

## 2020-12-18 RX ORDER — SODIUM CHLORIDE, SODIUM LACTATE, POTASSIUM CHLORIDE, CALCIUM CHLORIDE 600; 310; 30; 20 MG/100ML; MG/100ML; MG/100ML; MG/100ML
75 INJECTION, SOLUTION INTRAVENOUS CONTINUOUS
Status: DISCONTINUED | OUTPATIENT
Start: 2020-12-18 | End: 2020-12-18 | Stop reason: HOSPADM

## 2020-12-18 RX ORDER — OXYCODONE HYDROCHLORIDE AND ACETAMINOPHEN 5; 325 MG/1; MG/1
1 TABLET ORAL EVERY 4 HOURS PRN
Status: DISCONTINUED | OUTPATIENT
Start: 2020-12-18 | End: 2020-12-18 | Stop reason: HOSPADM

## 2020-12-18 RX ORDER — FENTANYL CITRATE 50 UG/ML
INJECTION, SOLUTION INTRAMUSCULAR; INTRAVENOUS AS NEEDED
Status: DISCONTINUED | OUTPATIENT
Start: 2020-12-18 | End: 2020-12-18

## 2020-12-18 RX ORDER — ONDANSETRON 2 MG/ML
INJECTION INTRAMUSCULAR; INTRAVENOUS AS NEEDED
Status: DISCONTINUED | OUTPATIENT
Start: 2020-12-18 | End: 2020-12-18

## 2020-12-18 RX ORDER — PROPOFOL 10 MG/ML
INJECTION, EMULSION INTRAVENOUS AS NEEDED
Status: DISCONTINUED | OUTPATIENT
Start: 2020-12-18 | End: 2020-12-18

## 2020-12-18 RX ORDER — CEFAZOLIN SODIUM 1 G/50ML
SOLUTION INTRAVENOUS AS NEEDED
Status: DISCONTINUED | OUTPATIENT
Start: 2020-12-18 | End: 2020-12-18

## 2020-12-18 RX ORDER — HYDROMORPHONE HCL/PF 1 MG/ML
0.5 SYRINGE (ML) INJECTION
Status: DISCONTINUED | OUTPATIENT
Start: 2020-12-18 | End: 2020-12-18 | Stop reason: HOSPADM

## 2020-12-18 RX ORDER — OXYCODONE HYDROCHLORIDE AND ACETAMINOPHEN 5; 325 MG/1; MG/1
1 TABLET ORAL EVERY 4 HOURS PRN
Qty: 20 TABLET | Refills: 0 | Status: SHIPPED | OUTPATIENT
Start: 2020-12-18 | End: 2020-12-28

## 2020-12-18 RX ADMIN — CEFAZOLIN SODIUM 1000 MG: 1 SOLUTION INTRAVENOUS at 12:02

## 2020-12-18 RX ADMIN — FENTANYL CITRATE 50 MCG: 50 INJECTION, SOLUTION INTRAMUSCULAR; INTRAVENOUS at 12:04

## 2020-12-18 RX ADMIN — ONDANSETRON 4 MG: 2 INJECTION INTRAMUSCULAR; INTRAVENOUS at 12:50

## 2020-12-18 RX ADMIN — SODIUM CHLORIDE, SODIUM LACTATE, POTASSIUM CHLORIDE, AND CALCIUM CHLORIDE 75 ML/HR: .6; .31; .03; .02 INJECTION, SOLUTION INTRAVENOUS at 10:41

## 2020-12-18 RX ADMIN — MIDAZOLAM 2 MG: 1 INJECTION INTRAMUSCULAR; INTRAVENOUS at 12:02

## 2020-12-18 RX ADMIN — OXYCODONE HYDROCHLORIDE AND ACETAMINOPHEN 1 TABLET: 5; 325 TABLET ORAL at 14:36

## 2020-12-18 RX ADMIN — GLYCOPYRROLATE 0.2 MG: 0.2 INJECTION, SOLUTION INTRAMUSCULAR; INTRAVENOUS at 12:23

## 2020-12-18 RX ADMIN — FENTANYL CITRATE 25 MCG: 50 INJECTION, SOLUTION INTRAMUSCULAR; INTRAVENOUS at 12:54

## 2020-12-18 RX ADMIN — PROPOFOL 200 MG: 10 INJECTION, EMULSION INTRAVENOUS at 12:04

## 2020-12-18 RX ADMIN — FENTANYL CITRATE 25 MCG: 50 INJECTION, SOLUTION INTRAMUSCULAR; INTRAVENOUS at 12:59

## 2020-12-18 RX ADMIN — GLYCOPYRROLATE 0.2 MG: 0.2 INJECTION, SOLUTION INTRAMUSCULAR; INTRAVENOUS at 12:04

## 2020-12-21 DIAGNOSIS — K21.9 GASTROESOPHAGEAL REFLUX DISEASE: ICD-10-CM

## 2020-12-21 RX ORDER — OMEPRAZOLE 20 MG/1
20 CAPSULE, DELAYED RELEASE ORAL
Qty: 90 CAPSULE | Refills: 5 | Status: SHIPPED | OUTPATIENT
Start: 2020-12-21 | End: 2021-02-26 | Stop reason: SDUPTHER

## 2020-12-28 ENCOUNTER — OFFICE VISIT (OUTPATIENT)
Dept: PODIATRY | Facility: CLINIC | Age: 55
End: 2020-12-28

## 2020-12-28 VITALS — BODY MASS INDEX: 22.51 KG/M2 | HEIGHT: 71 IN

## 2020-12-28 DIAGNOSIS — Q74.2 ACCESSORY NAVICULAR BONE OF FOOT: ICD-10-CM

## 2020-12-28 DIAGNOSIS — M76.821 POSTERIOR TIBIAL TENDONITIS, RIGHT: Primary | ICD-10-CM

## 2020-12-28 PROCEDURE — 99024 POSTOP FOLLOW-UP VISIT: CPT | Performed by: PODIATRIST

## 2021-01-18 ENCOUNTER — OFFICE VISIT (OUTPATIENT)
Dept: PODIATRY | Facility: CLINIC | Age: 56
End: 2021-01-18

## 2021-01-18 VITALS
DIASTOLIC BLOOD PRESSURE: 84 MMHG | SYSTOLIC BLOOD PRESSURE: 135 MMHG | HEIGHT: 71 IN | BODY MASS INDEX: 22.51 KG/M2 | HEART RATE: 75 BPM

## 2021-01-18 DIAGNOSIS — Q74.2 ACCESSORY NAVICULAR BONE OF FOOT: Primary | ICD-10-CM

## 2021-01-18 DIAGNOSIS — M76.821 POSTERIOR TIBIAL TENDONITIS, RIGHT: ICD-10-CM

## 2021-01-18 PROCEDURE — 99024 POSTOP FOLLOW-UP VISIT: CPT | Performed by: PODIATRIST

## 2021-01-18 NOTE — PROGRESS NOTES
Assessment/Plan:      Diagnoses and all orders for this visit:    Accessory navicular bone of foot  -     Ambulatory referral to Physical Therapy; Future    Posterior tibial tendonitis, right  -     Ambulatory referral to Physical Therapy; Future      2 weeks WB in CAM then transition to sneaker with PT    Patient is recovering very well from his posterior tibial tendon advancement and resection of extra accessory navicular bone  He has good posterior tibial tendon strength today  At this point I would like him to begin light weight-bearing in the cam boot  Start with crutches and do 50% crutches 50% boot  He is in the boot for 2 weeks and at that point can begin to transition to sneaker with physical therapy  Prescription given get  Check in 3-4 weeks for hopeful return to work  Subjective:     Patient ID: Matt Serrano is a 48 y o  male  Right kidner with PTT advancement 12/18/2020  He is doing well  Review of Systems      Objective:     Physical Exam  Vitals signs reviewed  Constitutional:       General: He is not in acute distress  Appearance: He is normal weight  He is not toxic-appearing  Musculoskeletal:        Feet:    Neurological:      Mental Status: He is alert

## 2021-02-08 ENCOUNTER — OFFICE VISIT (OUTPATIENT)
Dept: PODIATRY | Facility: CLINIC | Age: 56
End: 2021-02-08

## 2021-02-08 VITALS — BODY MASS INDEX: 23.1 KG/M2 | WEIGHT: 165 LBS | HEIGHT: 71 IN

## 2021-02-08 DIAGNOSIS — Q74.2 ACCESSORY NAVICULAR BONE OF FOOT: Primary | ICD-10-CM

## 2021-02-08 DIAGNOSIS — M76.821 POSTERIOR TIBIAL TENDONITIS, RIGHT: ICD-10-CM

## 2021-02-08 PROCEDURE — 99024 POSTOP FOLLOW-UP VISIT: CPT | Performed by: PODIATRIST

## 2021-02-08 NOTE — PROGRESS NOTES
Assessment/Plan:      Diagnoses and all orders for this visit:    Accessory navicular bone of foot    Posterior tibial tendonitis, right      patient is 2 months status post posterior tibial tendon repair/advancement with removal of accessory navicular bone  He is doing very well with recovery and following physical therapy exercising  His only discomfort seems to be anterior ankle but he is having no pain or weakness along the posterior tibial tendon or advancement site at the navicular  At this point we may begin to advance as tolerated  I would like to check his progress again in 6-8 weeks but I have no specific limitation  He may return to work to full duty  Continue formal therapy  Subjective:     Patient ID: Stalin Zarate is a 48 y o  male  PAtient is 2 months s/p PTT advancement and accessory navicula removal  He is doing well  Some minor anterior ankle pain  Review of Systems      Objective:     Physical Exam    Right foot exam shows small scab over incision but it is healed  No sign of complication  Examination of the posterior tibial tendon shows it is palpable from the medial aspect of the lower leg through the medial malleolus into the navicular  Muscle strength of the posterior tibial tendon is normal   No pain with stressed inversion dorsiflexion plantar flexion or eversion  Patient does have vague tenderness of the anterior ankle  His extensor muscles are normal and not painful  Gait exam shows normal gait  No significant overpronation  Mild pes planus on stance bilateral but it is symmetrical   Patient able to perform single heel raise with normal heel inversion and no pain

## 2021-02-08 NOTE — LETTER
February 8, 2021     Patient: Mallory Corporal   YOB: 1965   Date of Visit: 2/8/2021       To Whom it May Concern:    Lorene Adler is under my professional care  He was seen in my office on 2/8/2021  He may return to work on 2/15/2021 without limitation  If you have any questions or concerns, please don't hesitate to call  Sincerely,          Ki Mcmullen DPM        CC: Jen Fitch

## 2021-02-26 DIAGNOSIS — K21.9 GASTROESOPHAGEAL REFLUX DISEASE: ICD-10-CM

## 2021-02-26 RX ORDER — OMEPRAZOLE 20 MG/1
20 CAPSULE, DELAYED RELEASE ORAL
Qty: 90 CAPSULE | Refills: 5 | Status: SHIPPED | OUTPATIENT
Start: 2021-02-26 | End: 2022-03-22

## 2021-04-12 ENCOUNTER — OFFICE VISIT (OUTPATIENT)
Dept: PODIATRY | Facility: CLINIC | Age: 56
End: 2021-04-12
Payer: COMMERCIAL

## 2021-04-12 VITALS
WEIGHT: 163.6 LBS | BODY MASS INDEX: 22.9 KG/M2 | SYSTOLIC BLOOD PRESSURE: 120 MMHG | DIASTOLIC BLOOD PRESSURE: 69 MMHG | HEART RATE: 48 BPM | HEIGHT: 71 IN

## 2021-04-12 DIAGNOSIS — Q74.2 ACCESSORY NAVICULAR BONE OF FOOT: Primary | ICD-10-CM

## 2021-04-12 DIAGNOSIS — M76.821 POSTERIOR TIBIAL TENDONITIS, RIGHT: ICD-10-CM

## 2021-04-12 PROCEDURE — 99212 OFFICE O/P EST SF 10 MIN: CPT | Performed by: PODIATRIST

## 2021-04-12 NOTE — PROGRESS NOTES
Assessment/Plan:      Diagnoses and all orders for this visit:    Accessory navicular bone of foot    Posterior tibial tendonitis, right      patient is 4 months status post repair of his posterior tibial tendon and removal of accessory navicular bone  He is able to walk and jog without pain but the orthotic is rubbing the surgical incision  This should be easily corrected if he takes the insert to the device company that made it and has them shave away these hard plastic in that area  He is able to perform single heel raise without pain but after approximately 10 he does begin to feel weakness  He still rehabilitating the strength of the tendon but the overall repair is strong and he may advance as tolerated  Subjective:     Patient ID: Luisa Simmons is a 48 y o  male  Patient is 3-4 months status post repair of his posterior tibial tendon  He has been able to jog without any pain or discomfort  He does state the orthotic rubs right where the incision was which causes pinpoint tenderness but when he does is tendon exercising strength and heel raises it does not hurt  Review of Systems      Objective:     Physical Exam  Cardiovascular:      Pulses:           Dorsalis pedis pulses are 2+ on the right side  Posterior tibial pulses are 2+ on the right side  Musculoskeletal:      Right foot: Normal range of motion  No deformity (Patient able to perform single heel raise with normal inversion of the heel and no posterior tibial tendon pain  No weakness of the posterior tibial tendon with stressed inversion and dorsiflexion of the ankle  Minimal tenderness at the insertion of the )  Feet:      Right foot:      Protective Sensation: 10 sites tested  10 sites sensed  Skin integrity: No ulcer

## 2021-04-21 ENCOUNTER — TELEPHONE (OUTPATIENT)
Dept: PODIATRY | Facility: CLINIC | Age: 56
End: 2021-04-21

## 2021-04-21 NOTE — TELEPHONE ENCOUNTER
Zane Yuimko called because he received a bill for 4/12/21  It was scheduled as a post op and I noticed that his surgery was 12/18/21  I know this date is past the 12 weeks  However, he told me that when he was in the treatment room he told you that he is now unemployed and you said to him well, you are roxanne that this is still considered post op  If it is still considered post op than the billing code would need to be changed from follow up to post op

## 2021-04-22 ENCOUNTER — TELEPHONE (OUTPATIENT)
Dept: PODIATRY | Facility: CLINIC | Age: 56
End: 2021-04-22

## 2021-04-22 NOTE — TELEPHONE ENCOUNTER
Dr Dolores Ochoa feels it is a post op visit /4/12/21) and said that is how he billed it  He asked to get the Podiatry Manager, Shai Brewster, involved  I sent an email to Diamond Sims

## 2021-08-26 ENCOUNTER — OFFICE VISIT (OUTPATIENT)
Dept: FAMILY MEDICINE CLINIC | Facility: CLINIC | Age: 56
End: 2021-08-26
Payer: COMMERCIAL

## 2021-08-26 VITALS
SYSTOLIC BLOOD PRESSURE: 108 MMHG | OXYGEN SATURATION: 100 % | HEIGHT: 71 IN | BODY MASS INDEX: 21.81 KG/M2 | RESPIRATION RATE: 16 BRPM | HEART RATE: 57 BPM | WEIGHT: 155.8 LBS | DIASTOLIC BLOOD PRESSURE: 76 MMHG | TEMPERATURE: 97.5 F

## 2021-08-26 DIAGNOSIS — D12.6 COLON ADENOMA: Chronic | ICD-10-CM

## 2021-08-26 DIAGNOSIS — Z82.49 FAMILY HISTORY OF AORTIC DISSECTION: Chronic | ICD-10-CM

## 2021-08-26 DIAGNOSIS — Z00.00 WELLNESS EXAMINATION: Primary | Chronic | ICD-10-CM

## 2021-08-26 DIAGNOSIS — Z12.12 SCREENING FOR COLORECTAL CANCER: ICD-10-CM

## 2021-08-26 DIAGNOSIS — L30.9 DERMATITIS: ICD-10-CM

## 2021-08-26 DIAGNOSIS — Z12.11 SCREENING FOR COLORECTAL CANCER: ICD-10-CM

## 2021-08-26 DIAGNOSIS — K20.0 EOSINOPHILIC ESOPHAGITIS: Chronic | ICD-10-CM

## 2021-08-26 DIAGNOSIS — Z11.59 NEED FOR HEPATITIS C SCREENING TEST: ICD-10-CM

## 2021-08-26 DIAGNOSIS — Z11.4 SCREENING FOR HIV (HUMAN IMMUNODEFICIENCY VIRUS): ICD-10-CM

## 2021-08-26 PROBLEM — K21.9 GASTROESOPHAGEAL REFLUX DISEASE WITHOUT ESOPHAGITIS: Chronic | Status: ACTIVE | Noted: 2021-08-26

## 2021-08-26 LAB — HCV AB SER QL: NORMAL

## 2021-08-26 PROCEDURE — 99396 PREV VISIT EST AGE 40-64: CPT | Performed by: FAMILY MEDICINE

## 2021-08-26 PROCEDURE — 86803 HEPATITIS C AB TEST: CPT | Performed by: FAMILY MEDICINE

## 2021-08-26 PROCEDURE — 87389 HIV-1 AG W/HIV-1&-2 AB AG IA: CPT | Performed by: FAMILY MEDICINE

## 2021-08-26 PROCEDURE — 36415 COLL VENOUS BLD VENIPUNCTURE: CPT | Performed by: FAMILY MEDICINE

## 2021-08-26 NOTE — PROGRESS NOTES
Assessment/Plan:    No problem-specific Assessment & Plan notes found for this encounter  Diagnoses and all orders for this visit:    Wellness examination    Need for hepatitis C screening test  -     Hepatitis C Antibody ([facility]); Future    Screening for HIV (human immunodeficiency virus)  -     HIV 1/2 Antigen/Antibody (4th Generation) w Reflex [facility]; Future    Screening for colorectal cancer    Eosinophilic esophagitis    Dermatitis    Colon adenoma    Family history of aortic dissection    Other orders  -     Cancel: Ambulatory referral to Gastroenterology; Future          Subjective:      Patient ID: Vikas Ledezma is a 48 y o  male  PATIENT RETURNS FOR FOLLOW-UP OF CHRONIC MEDICAL CONDITIONS  NO HOSPITAL STAYS OR EMERGENCY VISITS RECENTLY  MEDS WERE REVIEWED AND NO SIDE EFFECTS  NO NEW ISSUES  UNLESS NOTED BELOW  NO NEW MEDICAL PROVIDER REPORTED  THE CHRONIC DISEASES LISTED ABOVE ARE STABLE AND UNCHANGED/ THE PLAN OF CARE FOR THOSE WILL REMAIN UNCHANGED UNLESS NOTED BELOW  Colon 1/18      The following portions of the patient's history were reviewed and updated as appropriate: allergies, current medications, past family history, past medical history, past social history, past surgical history and problem list     Review of Systems   Constitutional: Negative for activity change and appetite change  HENT: Negative for trouble swallowing  Eyes: Negative for visual disturbance  Respiratory: Negative for cough and shortness of breath  Cardiovascular: Negative for chest pain, palpitations and leg swelling  Gastrointestinal: Negative for abdominal pain and blood in stool  Endocrine: Negative for polyuria  Genitourinary: Negative for difficulty urinating and hematuria  Skin: Negative for rash  Neurological: Negative for dizziness  Psychiatric/Behavioral: Negative for behavioral problems             Objective:  Vitals:    08/26/21 0852   BP: 108/76   BP Location: Left arm   Patient Position: Sitting   Cuff Size: Large   Pulse: 57   Resp: 16   Temp: 97 5 °F (36 4 °C)   TempSrc: Tympanic   SpO2: 100%   Weight: 70 7 kg (155 lb 12 8 oz)   Height: 5' 10 5" (1 791 m)      Physical Exam  Constitutional:       Appearance: He is well-developed  HENT:      Head: Normocephalic and atraumatic  Eyes:      Conjunctiva/sclera: Conjunctivae normal    Neck:      Thyroid: No thyromegaly  Cardiovascular:      Rate and Rhythm: Normal rate and regular rhythm  Heart sounds: Normal heart sounds  No murmur heard  Pulmonary:      Effort: Pulmonary effort is normal  No respiratory distress  Breath sounds: Normal breath sounds  Musculoskeletal:      Cervical back: Neck supple  Lymphadenopathy:      Cervical: No cervical adenopathy  Skin:     General: Skin is warm and dry  Psychiatric:         Behavior: Behavior normal                Patient's chronic problems that were reviewed today are stable  Recent hospital stays reviewed  Recent labs and imaging reviewed  Recent visits to other providers reviewed  Meds reviewed and no changes made  Appropriate labs and imaging were ordered  Preventive measures appropriate for age and sex were reviewed with patient  Immunizations were updated as appropriate

## 2021-08-26 NOTE — PATIENT INSTRUCTIONS
I will check on recommendations about the aortic dissection  I will get back to in the next week or 2 about recommendations  Flu shot will be available as we speak but I recommend October or November  Keep your eyes p o  Also for the booster for COVID vaccine

## 2021-08-27 ENCOUNTER — TELEPHONE (OUTPATIENT)
Dept: FAMILY MEDICINE CLINIC | Facility: CLINIC | Age: 56
End: 2021-08-27

## 2021-08-27 LAB — HIV 1+2 AB+HIV1 P24 AG SERPL QL IA: NORMAL

## 2021-08-27 NOTE — TELEPHONE ENCOUNTER
Patient called and would like a letter generated for his employer stating that he had a physical examination and healthy and is cleared to work  Can I generate this letter for him?

## 2022-03-22 DIAGNOSIS — K21.9 GASTROESOPHAGEAL REFLUX DISEASE: ICD-10-CM

## 2022-03-22 RX ORDER — OMEPRAZOLE 20 MG/1
20 CAPSULE, DELAYED RELEASE ORAL
Qty: 30 CAPSULE | Refills: 17 | Status: SHIPPED | OUTPATIENT
Start: 2022-03-22

## 2022-09-29 ENCOUNTER — RA CDI HCC (OUTPATIENT)
Dept: OTHER | Facility: HOSPITAL | Age: 57
End: 2022-09-29

## 2022-09-29 NOTE — PROGRESS NOTES
[facility]  coding opportunities       Chart reviewed, no opportunity found: CHART REVIEWED, NO OPPORTUNITY FOUND        Patients Insurance        Commercial Insurance: [address] [place] [address]

## 2022-10-06 ENCOUNTER — OFFICE VISIT (OUTPATIENT)
Dept: FAMILY MEDICINE CLINIC | Facility: CLINIC | Age: 57
End: 2022-10-06
Payer: COMMERCIAL

## 2022-10-06 ENCOUNTER — TELEPHONE (OUTPATIENT)
Dept: ADMINISTRATIVE | Facility: OTHER | Age: 57
End: 2022-10-06

## 2022-10-06 VITALS
TEMPERATURE: 97.9 F | DIASTOLIC BLOOD PRESSURE: 72 MMHG | HEIGHT: 70 IN | WEIGHT: 160.8 LBS | HEART RATE: 43 BPM | RESPIRATION RATE: 16 BRPM | SYSTOLIC BLOOD PRESSURE: 98 MMHG | OXYGEN SATURATION: 98 % | BODY MASS INDEX: 23.02 KG/M2

## 2022-10-06 DIAGNOSIS — Z00.00 WELLNESS EXAMINATION: Primary | Chronic | ICD-10-CM

## 2022-10-06 DIAGNOSIS — D12.6 COLON ADENOMA: Chronic | ICD-10-CM

## 2022-10-06 DIAGNOSIS — K20.0 EOSINOPHILIC ESOPHAGITIS: Chronic | ICD-10-CM

## 2022-10-06 DIAGNOSIS — Z82.49 FAMILY HISTORY OF AORTIC DISSECTION: Chronic | ICD-10-CM

## 2022-10-06 PROCEDURE — 99396 PREV VISIT EST AGE 40-64: CPT | Performed by: FAMILY MEDICINE

## 2022-10-06 PROCEDURE — 90471 IMMUNIZATION ADMIN: CPT | Performed by: FAMILY MEDICINE

## 2022-10-06 PROCEDURE — 90682 RIV4 VACC RECOMBINANT DNA IM: CPT | Performed by: FAMILY MEDICINE

## 2022-10-06 RX ORDER — TRIAMCINOLONE ACETONIDE 5 MG/G
CREAM TOPICAL 3 TIMES DAILY
Qty: 30 G | Refills: 6 | Status: SHIPPED | OUTPATIENT
Start: 2022-10-06

## 2022-10-06 NOTE — PATIENT INSTRUCTIONS
You might want to consider a shoe expander for new shoes  This will make the pressure on her toes a little bit less  You can use a course emery board to file down the calluses to keep those in good shape  Update the tetanus shot with Adult Tdap at your local pharmacy

## 2022-10-06 NOTE — TELEPHONE ENCOUNTER
----- Message from Alejandra Sánchez sent at 10/6/2022  7:28 AM EDT -----  Regarding: Care Gap Request  10/06/22 7:28 AM    Hello, our patient attached above has had CRC: Colonoscopy completed/performed  Please assist in updating the patient chart by pulling the Care Everywhere (CE) document  The date of service is 01/17/2018       Thank you,  Migel HOROWITZ [facility] AT [address] [facility] [place]

## 2022-10-06 NOTE — PROGRESS NOTES
Assessment/Plan:    No problem-specific Assessment & Plan notes found for this encounter  Diagnoses and all orders for this visit:    Wellness examination    Family history of aortic dissection    Eosinophilic esophagitis    Colon adenoma          Subjective:      Patient ID: Lory Thorne is a 55 y o  male  PATIENT RETURNS FOR FOLLOW-UP OF CHRONIC MEDICAL CONDITIONS  ANY HOSPITAL VISITS, EMERGENCY VISITS AND OTHER PROVIDER VISITS SINCE LAST TIME WERE REVIEWED  MEDS WERE REVIEWED AND NO SIDE EFFECTS  NO NEW ISSUES  UNLESS NOTED BELOW  NO NEW MEDICAL PROVIDER REPORTED  THE CHRONIC DISEASES LISTED ABOVE ARE STABLE AND UNCHANGED/ THE PLAN OF CARE FOR THOSE WILL REMAIN UNCHANGED UNLESS NOTED BELOW  Discussed screening U/S for + FH AAA: wants to hold until next yr : asx     Wellness      The following portions of the patient's history were reviewed and updated as appropriate: allergies, current medications, past family history, past medical history, past social history, past surgical history and problem list     Review of Systems   Constitutional: Negative for activity change and appetite change  HENT: Negative for trouble swallowing  Eyes: Negative for visual disturbance  Respiratory: Negative for cough and shortness of breath  Cardiovascular: Negative for chest pain, palpitations and leg swelling  Gastrointestinal: Negative for abdominal pain and blood in stool  Endocrine: Negative for polyuria  Genitourinary: Negative for difficulty urinating and hematuria  Skin: Negative for rash  Neurological: Negative for dizziness  Psychiatric/Behavioral: Negative for behavioral problems           Objective:  Vitals:    10/06/22 1533   BP: 98/72   BP Location: Left arm   Patient Position: Sitting   Cuff Size: Large   Pulse: (!) 43   Resp: 16   Temp: 97 9 °F (36 6 °C)   SpO2: 98%   Weight: 72 9 kg (160 lb 12 8 oz)   Height: 5' 9 75" (1 772 m)      Physical Exam  Constitutional: Appearance: He is well-developed  HENT:      Head: Normocephalic and atraumatic  Eyes:      Conjunctiva/sclera: Conjunctivae normal    Neck:      Thyroid: No thyromegaly  Cardiovascular:      Rate and Rhythm: Normal rate and regular rhythm  Heart sounds: Normal heart sounds  No murmur heard  Pulmonary:      Effort: Pulmonary effort is normal  No respiratory distress  Breath sounds: Normal breath sounds  Musculoskeletal:      Cervical back: Neck supple  Lymphadenopathy:      Cervical: No cervical adenopathy  Skin:     General: Skin is warm and dry  Psychiatric:         Behavior: Behavior normal            Patient's chronic problems that were reviewed today are stable  Recent hospital stays reviewed  Recent labs and imaging reviewed  Recent visits to other providers reviewed  Meds reviewed and no changes made  Appropriate labs and imaging were ordered  Preventive measures appropriate for age and sex were reviewed with patient  Immunizations were updated as appropriate        Shaved callus R foot

## 2022-10-07 NOTE — TELEPHONE ENCOUNTER
Upon review of the In Basket request we were able to locate, review, and update the patient chart as requested for CRC: Colonoscopy  Any additional questions or concerns should be emailed to the Practice Liaisons via the appropriate education email address, please do not reply via In Basket      Thank you  Brii Oliveira

## 2023-08-07 ENCOUNTER — PREP FOR PROCEDURE (OUTPATIENT)
Age: 58
End: 2023-08-07

## 2023-08-07 ENCOUNTER — TELEPHONE (OUTPATIENT)
Age: 58
End: 2023-08-07

## 2023-08-07 DIAGNOSIS — Z86.010 HISTORY OF COLON POLYPS: Primary | ICD-10-CM

## 2023-08-07 NOTE — TELEPHONE ENCOUNTER
08/07/23  Screened by: Yolande Stiles    Referring Provider     Pre- Screening: There is no height or weight on file to calculate BMI. Has patient been referred for a routine screening Colonoscopy? yes  Is the patient between 43-73 years old? yes      Previous Colonoscopy yes   If yes:    Date: 6 YEARS     Facility:  [facility]    Reason:       SCHEDULING STAFF: If the patient is between 39yrs-51yrs, please advise patient to confirm benefits/coverage with their insurance company for a routine screening colonoscopy, some insurance carriers will only cover at [facility] or [facility]. If the patient is over 66years old, please schedule an office visit. Does the patient want to see a Gastroenterologist prior to their procedure OR are they having any GI symptoms? no    Has the patient been hospitalized or had abdominal surgery in the past 6 months? no    Does the patient use supplemental oxygen? no    Does the patient take Coumadin, Lovenox, Plavix, Elliquis, Xarelto, or other blood thinning medication? no    Has the patient had a stroke, cardiac event, or stent placed in the past year? no    SCHEDULING STAFF: If patient answers NO to above questions, then schedule procedure. If patient answers YES to above questions, then schedule office appointment. PASSED OA    If patient is between 45yrs - 49yrs, please advise patient that we will have to confirm benefits & coverage with their insurance company for a routine screening colonoscopy.

## 2023-08-07 NOTE — TELEPHONE ENCOUNTER
Scheduled date of colonoscopy (as of today):  9/18/23    Physician performing colonoscopy: DR CALDERON    [facility]    Instructions reviewed with patient by: Vanita Maguire / Imelda Whitman    Clearances:  N/A

## 2023-08-07 NOTE — TELEPHONE ENCOUNTER
[address] [facility],[address] Assessment    Name: Alexander Brunner  YOB: 1965  Last Height: 5' 9.75" (1.772 m)  Last weight: 72.9 kg (160 lb 12.8 oz)  BMI: 23.24 kg/m²  Procedure: COLON  Diagnosis: HX POLYP  Date of procedure: 9/18/23  Prep: Kin Pappas / Lala Alcantar  Responsible : Kelvin Kellogg  Phone#: [phone]  Name completing form: Cedric Maharaj  Date form completed: 08/07/23      If the patient answers yes to any of these questions, schedule in a hospital  Are you pregnant: No  Do you rely on a wheelchair for mobility: No  Have you been diagnosed with End Stage Renal Disease (ESRD): No  Do you need oxygen during the day: No  Have you had a heart attack or stroke within the past three months: No  Have you had a seizure within the past three months: No  Have you ever been informed by anesthesia that you have a difficult airway: No  Additional Questions  Have you had any cardiac testing or are under the care of a Cardiologist (see cardiac list): No  Cardiac list:   Do you have an implanted cardiac defibrillator: No (Comment:  This patient should be scheduled in the hospital)    Have any bleeding problems, such as anemia or hemophilia (If patient has H&H result below 8, schedule in hospital.  H&H must be within 30 days of procedure): No    Had an organ transplant within the past 3 months: No    Do you have any present infections: No  Do you get short of breath when walking a few blocks: No  Have you been diagnosed with diabetes: No  Comments (provide cardiac provider information if applicable):

## 2023-08-08 ENCOUNTER — TELEPHONE (OUTPATIENT)
Age: 58
End: 2023-08-08

## 2023-08-08 NOTE — TELEPHONE ENCOUNTER
Spoke with pt, explained that the code is hx of poly because his last colonoscopy 5 years ago there was poly removed. Pt stated he may need to call back to cancel he is unsure if he will cont. As his deductible is 5000 and he can not afford at this time.

## 2023-08-08 NOTE — TELEPHONE ENCOUNTER
Patients GI provider:  Dr. Wan Sánchez    Number to return call: [phone]    Reason for call: Pt called in stating that he reached out to his ins and was told that he would have to come out of pocket because of the way it was coded. Diagnosis is in under hx of colon polyps. Pt requesting to see if the coding can be changed some how so it can be fully covered. Above is his call back number.      Scheduled procedure/appointment date if applicable: procedure 6/11/55

## 2023-12-05 ENCOUNTER — OFFICE VISIT (OUTPATIENT)
Dept: FAMILY MEDICINE CLINIC | Facility: CLINIC | Age: 58
End: 2023-12-05
Payer: COMMERCIAL

## 2023-12-05 VITALS
HEIGHT: 70 IN | HEART RATE: 45 BPM | SYSTOLIC BLOOD PRESSURE: 122 MMHG | WEIGHT: 164.6 LBS | TEMPERATURE: 97.6 F | OXYGEN SATURATION: 99 % | DIASTOLIC BLOOD PRESSURE: 76 MMHG | BODY MASS INDEX: 23.56 KG/M2

## 2023-12-05 DIAGNOSIS — K20.0 EOSINOPHILIC ESOPHAGITIS: Chronic | ICD-10-CM

## 2023-12-05 DIAGNOSIS — R35.1 BENIGN PROSTATIC HYPERPLASIA WITH NOCTURIA: ICD-10-CM

## 2023-12-05 DIAGNOSIS — Z12.5 SCREENING FOR PROSTATE CANCER: ICD-10-CM

## 2023-12-05 DIAGNOSIS — Z13.6 SCREENING FOR CARDIOVASCULAR CONDITION: ICD-10-CM

## 2023-12-05 DIAGNOSIS — Z00.00 ANNUAL PHYSICAL EXAM: Primary | ICD-10-CM

## 2023-12-05 DIAGNOSIS — L30.9 DERMATITIS: ICD-10-CM

## 2023-12-05 DIAGNOSIS — Z13.1 SCREENING FOR DIABETES MELLITUS: ICD-10-CM

## 2023-12-05 DIAGNOSIS — Z23 ENCOUNTER FOR IMMUNIZATION: ICD-10-CM

## 2023-12-05 DIAGNOSIS — N40.1 BENIGN PROSTATIC HYPERPLASIA WITH NOCTURIA: ICD-10-CM

## 2023-12-05 PROCEDURE — 99396 PREV VISIT EST AGE 40-64: CPT | Performed by: FAMILY MEDICINE

## 2023-12-05 PROCEDURE — 99214 OFFICE O/P EST MOD 30 MIN: CPT | Performed by: FAMILY MEDICINE

## 2023-12-05 NOTE — PROGRESS NOTES
ADULT ANNUAL PHYSICAL  [address] GROUP    NAME: [place] Fabian [facility] [place]  AGE: 63 y.o. SEX: male  : 1965     DATE: 2023     Assessment and Plan:     Problem List Items Addressed This Visit        Digestive    Eosinophilic esophagitis (Chronic)     Stable  Denies symptoms  No longer on ppi         Relevant Orders    CBC and differential       Musculoskeletal and Integument    Dermatitis     Waxes and wanes  Likely atopic dermatitis  Encouraged cool showers and liberal use of hydrating skin lotion  Continue kenalog as needed            Other    Vasectomy evaluation - Primary     Hold off on urology referral for now. Discussed with patient that once his wife is amenorrheic for 1 full year she cannot get pregnant  He should continue to use contraception until that happens  She is 47years old and this should occur in the next 3-4 years. Benign prostatic hyperplasia with nocturia     New  Reports nocturia and occasional decreased stream strength  GERARD shows enlarged nontender prostate  Discussed treatment options including alpha blockers  Patient is tolerating symptoms for now  Will check screening psa          Other Visit Diagnoses     Encounter for immunization        Screening for cardiovascular condition        Relevant Orders    Lipid panel    Screening for prostate cancer        Relevant Orders    PSA, Total Screen    Screening for diabetes mellitus        Relevant Orders    Comprehensive metabolic panel          Immunizations and preventive care screenings were discussed with patient today. Appropriate education was printed on patient's after visit summary. Discussed risks and benefits of prostate cancer screening. We discussed the controversial history of PSA screening for prostate cancer in the [facility] as well as the risk of over detection and over treatment of prostate cancer by way of PSA screening.   The patient understands that PSA blood testing is an imperfect way to screen for prostate cancer and that elevated PSA levels in the blood may also be caused by infection, inflammation, prostatic trauma or manipulation, urological procedures, or by benign prostatic enlargement. The role of the digital rectal examination in prostate cancer screening was also discussed and I discussed with him that there is large interobserver variability in the findings of digital rectal examination. Counseling:  Alcohol/drug use: discussed moderation in alcohol intake, the recommendations for healthy alcohol use, and avoidance of illicit drug use. Dental Health: discussed importance of regular tooth brushing, flossing, and dental visits. Injury prevention: discussed safety/seat belts, safety helmets, smoke detectors, carbon dioxide detectors, and smoking near bedding or upholstery. Sexual health: discussed sexually transmitted diseases, partner selection, use of condoms, avoidance of unintended pregnancy, and contraceptive alternatives. Exercise: the importance of regular exercise/physical activity was discussed. Recommend exercise 3-5 times per week for at least 30 minutes. Depression Screening and Follow-up Plan: Patient was screened for depression during today's encounter. They screened negative with a PHQ-2 score of 0. Return in about 1 year (around 12/5/2024). Chief Complaint:     Chief Complaint   Patient presents with   • Physical Exam     Vasectomy, testosterone level, dry skin       History of Present Illness:     Adult Annual Physical   Patient here for a comprehensive physical exam. The patient reports problems - multiple problems . Gerd and eosinophilic esophagitis which seems to be stable. Stopped taking omeprazole and is doing well. Donates platelets and was told he has low platelet counts. Surveillance Colonoscopy will not be covered by insurance. Will need to meet his deductible.  May consider cologuard as an option as well    He has questions regarding vasectomy    Diet and Physical Activity  Diet/Nutrition: well balanced diet and consuming 3-5 servings of fruits/vegetables daily. Exercise: moderate cardiovascular exercise, strength training exercises, and 5-7 times a week on average. Depression Screening  PHQ-2/9 Depression Screening    Little interest or pleasure in doing things: 0 - not at all  Feeling down, depressed, or hopeless: 0 - not at all  PHQ-2 Score: 0  PHQ-2 Interpretation: Negative depression screen       General Health  Sleep: sleeps well and gets 7-8 hours of sleep on average. Hearing: normal - bilateral.  Vision: goes for regular eye exams, most recent eye exam <1 year ago, and wears glasses. Dental: regular dental visits and brushes teeth once daily.  Health  Symptoms include: incomplete bladder emptying, nocturia, and post-void dribbling     Advanced Care Planning  Do you have an advanced directive? yes  Do you have a durable medical power of ? yes     Review of Systems:     Review of Systems   Constitutional:  Negative for activity change, chills, diaphoresis and fever. HENT:  Negative for ear pain, hearing loss, postnasal drip, rhinorrhea, sinus pressure, sinus pain, sneezing and sore throat. Respiratory:  Negative for cough, chest tightness, shortness of breath and wheezing. Cardiovascular:  Negative for chest pain, palpitations and leg swelling. Gastrointestinal:  Negative for abdominal pain, blood in stool, constipation, diarrhea, nausea and vomiting. Genitourinary:  Negative for dysuria, frequency, hematuria and urgency. Musculoskeletal:  Negative for arthralgias and myalgias. Neurological:  Negative for dizziness, syncope, weakness, light-headedness, numbness and headaches.       Past Medical History:     Past Medical History:   Diagnosis Date   • Abnormal findings on esophagogastroduodenoscopy (EGD)    • Eosinophilic esophagitis    • GERD (gastroesophageal reflux disease)       Past Surgical History:     Past Surgical History:   Procedure Laterality Date   • COLONOSCOPY     • SD UNLISTED PROCEDURE FOOT/TOES Right 12/18/2020    Procedure: TRANSFER posterior tibial TENDON FOOT with removal of accessory navicular bone;  Surgeon: Sharath Ashraf DPM;  Location:  MAIN OR;  Service: Podiatry   • UPPER GASTROINTESTINAL ENDOSCOPY        Family History:     Family History   Problem Relation Age of Onset   • Kidney cancer Father    • Aortic aneurysm Father         AAA   • Leukemia Family    • Diabetes Family       Social History:     Social History     Socioeconomic History   • Marital status: Unknown     Spouse name: None   • Number of children: None   • Years of education: None   • Highest education level: None   Occupational History   • None   Tobacco Use   • Smoking status: Never   • Smokeless tobacco: Never   Vaping Use   • Vaping Use: Never used   Substance and Sexual Activity   • Alcohol use: Not Currently   • Drug use: Never   • Sexual activity: None   Other Topics Concern   • None   Social History Narrative   • None     Social Determinants of Health     Financial Resource Strain: Not on file   Food Insecurity: Not on file   Transportation Needs: Not on file   Physical Activity: Not on file   Stress: Not on file   Social Connections: Not on file   Intimate Partner Violence: Not on file   Housing Stability: Not on file      Current Medications:     Current Outpatient Medications   Medication Sig Dispense Refill   • triamcinolone (KENALOG) 0.5 % cream Apply topically 3 (three) times a day 30 g 6   • omeprazole (PriLOSEC) 20 mg delayed release capsule TAKE 1 CAPSULE (20 MG TOTAL) BY MOUTH DAILY BEFORE BREAKFAST 30 capsule 17     No current facility-administered medications for this visit.       Allergies:     No Known Allergies   Physical Exam:     /76 (BP Location: Left arm, Patient Position: Sitting, Cuff Size: Adult)   Pulse (!) 45   Temp 97.6 °F (36.4 °C) (Temporal)   Ht 5' 10.2" (1.783 m)   Wt 74.7 kg (164 lb 9.6 oz)   SpO2 99%   BMI 23.48 kg/m²     Physical Exam  Vitals reviewed. Constitutional:       General: He is not in acute distress. Appearance: He is well-developed. He is not diaphoretic. HENT:      Head: Normocephalic and atraumatic. Right Ear: Tympanic membrane, ear canal and external ear normal. There is no impacted cerumen. Left Ear: Tympanic membrane, ear canal and external ear normal. There is no impacted cerumen. Nose: Nose normal. No congestion. Mouth/Throat:      Mouth: Mucous membranes are moist.      Pharynx: Oropharynx is clear. Eyes:      General: No scleral icterus. Right eye: No discharge. Left eye: No discharge. Conjunctiva/sclera: Conjunctivae normal.      Pupils: Pupils are equal, round, and reactive to light. Neck:      Vascular: No JVD. Cardiovascular:      Rate and Rhythm: Normal rate and regular rhythm. Heart sounds: Normal heart sounds. No murmur heard. No friction rub. Pulmonary:      Effort: Pulmonary effort is normal. No respiratory distress. Breath sounds: Normal breath sounds. No wheezing or rales. Chest:      Chest wall: No tenderness. Abdominal:      General: Bowel sounds are normal. There is no distension. Palpations: Abdomen is soft. There is no mass. Tenderness: There is no abdominal tenderness. There is no guarding or rebound. Genitourinary:     Prostate: Enlarged. Not tender and no nodules present. Rectum: No tenderness, external hemorrhoid or internal hemorrhoid. Musculoskeletal:         General: No tenderness or deformity. Normal range of motion. Cervical back: Normal range of motion and neck supple. Skin:     General: Skin is warm and dry. Findings: No erythema or rash. Neurological:      Mental Status: He is alert and oriented to person, place, and time. Mental status is at baseline. Cranial Nerves: No cranial nerve deficit.    Psychiatric:         Mood and Affect: Mood normal.          Trenton López, [address]

## 2023-12-06 PROBLEM — Z30.09 VASECTOMY EVALUATION: Status: ACTIVE | Noted: 2019-06-21

## 2023-12-06 PROBLEM — R35.1 BENIGN PROSTATIC HYPERPLASIA WITH NOCTURIA: Status: ACTIVE | Noted: 2023-12-06

## 2023-12-06 PROBLEM — N40.1 BENIGN PROSTATIC HYPERPLASIA WITH NOCTURIA: Status: ACTIVE | Noted: 2023-12-06

## 2023-12-06 NOTE — ASSESSMENT & PLAN NOTE
New  Reports nocturia and occasional decreased stream strength  GERARD shows enlarged nontender prostate  Discussed treatment options including alpha blockers  Patient is tolerating symptoms for now  Will check screening psa

## 2023-12-06 NOTE — ASSESSMENT & PLAN NOTE
Waxes and wanes  Likely atopic dermatitis  Encouraged cool showers and liberal use of hydrating skin lotion  Continue kenalog as needed

## 2023-12-06 NOTE — ASSESSMENT & PLAN NOTE
Hold off on urology referral for now. Discussed with patient that once his wife is amenorrheic for 1 full year she cannot get pregnant  He should continue to use contraception until that happens  She is 47years old and this should occur in the next 3-4 years.

## 2023-12-07 ENCOUNTER — APPOINTMENT (OUTPATIENT)
Dept: LAB | Facility: CLINIC | Age: 58
End: 2023-12-07
Payer: COMMERCIAL

## 2023-12-07 DIAGNOSIS — Z13.1 SCREENING FOR DIABETES MELLITUS: ICD-10-CM

## 2023-12-07 DIAGNOSIS — Z12.5 SCREENING FOR PROSTATE CANCER: ICD-10-CM

## 2023-12-07 DIAGNOSIS — Z13.6 SCREENING FOR CARDIOVASCULAR CONDITION: ICD-10-CM

## 2023-12-07 DIAGNOSIS — K20.0 EOSINOPHILIC ESOPHAGITIS: Chronic | ICD-10-CM

## 2023-12-07 LAB
ALBUMIN SERPL BCP-MCNC: 4.3 G/DL (ref 3.5–5)
ALP SERPL-CCNC: 49 U/L (ref 34–104)
ALT SERPL W P-5'-P-CCNC: 12 U/L (ref 7–52)
ANION GAP SERPL CALCULATED.3IONS-SCNC: 7 MMOL/L
AST SERPL W P-5'-P-CCNC: 19 U/L (ref 13–39)
BASOPHILS # BLD AUTO: 0.01 THOUSANDS/ÂΜL (ref 0–0.1)
BASOPHILS NFR BLD AUTO: 0 % (ref 0–1)
BILIRUB SERPL-MCNC: 1.24 MG/DL (ref 0.2–1)
BUN SERPL-MCNC: 22 MG/DL (ref 5–25)
CALCIUM SERPL-MCNC: 9.1 MG/DL (ref 8.4–10.2)
CHLORIDE SERPL-SCNC: 103 MMOL/L (ref 96–108)
CHOLEST SERPL-MCNC: 170 MG/DL
CO2 SERPL-SCNC: 30 MMOL/L (ref 21–32)
CREAT SERPL-MCNC: 0.82 MG/DL (ref 0.6–1.3)
EOSINOPHIL # BLD AUTO: 0.03 THOUSAND/ÂΜL (ref 0–0.61)
EOSINOPHIL NFR BLD AUTO: 1 % (ref 0–6)
ERYTHROCYTE [DISTWIDTH] IN BLOOD BY AUTOMATED COUNT: 13.2 % (ref 11.6–15.1)
GFR SERPL CREATININE-BSD FRML MDRD: 98 ML/MIN/1.73SQ M
GLUCOSE P FAST SERPL-MCNC: 82 MG/DL (ref 65–99)
HCT VFR BLD AUTO: 36.3 % (ref 36.5–49.3)
HDLC SERPL-MCNC: 51 MG/DL
HGB BLD-MCNC: 12.1 G/DL (ref 12–17)
IMM GRANULOCYTES # BLD AUTO: 0.01 THOUSAND/UL (ref 0–0.2)
IMM GRANULOCYTES NFR BLD AUTO: 0 % (ref 0–2)
LDLC SERPL CALC-MCNC: 104 MG/DL (ref 0–100)
LYMPHOCYTES # BLD AUTO: 0.48 THOUSANDS/ÂΜL (ref 0.6–4.47)
LYMPHOCYTES NFR BLD AUTO: 15 % (ref 14–44)
MCH RBC QN AUTO: 29.4 PG (ref 26.8–34.3)
MCHC RBC AUTO-ENTMCNC: 33.3 G/DL (ref 31.4–37.4)
MCV RBC AUTO: 88 FL (ref 82–98)
MONOCYTES # BLD AUTO: 0.36 THOUSAND/ÂΜL (ref 0.17–1.22)
MONOCYTES NFR BLD AUTO: 11 % (ref 4–12)
NEUTROPHILS # BLD AUTO: 2.26 THOUSANDS/ÂΜL (ref 1.85–7.62)
NEUTS SEG NFR BLD AUTO: 73 % (ref 43–75)
NONHDLC SERPL-MCNC: 119 MG/DL
NRBC BLD AUTO-RTO: 0 /100 WBCS
PLATELET # BLD AUTO: 247 THOUSANDS/UL (ref 149–390)
PMV BLD AUTO: 10.5 FL (ref 8.9–12.7)
POTASSIUM SERPL-SCNC: 4.3 MMOL/L (ref 3.5–5.3)
PROT SERPL-MCNC: 6.3 G/DL (ref 6.4–8.4)
PSA SERPL-MCNC: 0.86 NG/ML (ref 0–4)
RBC # BLD AUTO: 4.11 MILLION/UL (ref 3.88–5.62)
SODIUM SERPL-SCNC: 140 MMOL/L (ref 135–147)
TRIGL SERPL-MCNC: 75 MG/DL
WBC # BLD AUTO: 3.15 THOUSAND/UL (ref 4.31–10.16)

## 2023-12-07 PROCEDURE — 36415 COLL VENOUS BLD VENIPUNCTURE: CPT

## 2023-12-07 PROCEDURE — 80053 COMPREHEN METABOLIC PANEL: CPT

## 2023-12-07 PROCEDURE — 85025 COMPLETE CBC W/AUTO DIFF WBC: CPT

## 2023-12-07 PROCEDURE — G0103 PSA SCREENING: HCPCS

## 2023-12-07 PROCEDURE — 80061 LIPID PANEL: CPT

## 2023-12-14 DIAGNOSIS — R17 ELEVATED BILIRUBIN: ICD-10-CM

## 2023-12-14 DIAGNOSIS — D72.819 LEUKOPENIA, UNSPECIFIED TYPE: Primary | ICD-10-CM

## 2023-12-27 ENCOUNTER — TELEPHONE (OUTPATIENT)
Dept: FAMILY MEDICINE CLINIC | Facility: CLINIC | Age: 58
End: 2023-12-27

## 2023-12-27 NOTE — TELEPHONE ENCOUNTER
Patient requesting to speak with manager regarding bill from physical 12/5/2023 with Dr Chowdhury  he was charged for a physical as well as an office visit  he states he did everything as he did in the past with Dr Franco and was never charged anything extra  [phone]

## 2023-12-27 NOTE — TELEPHONE ENCOUNTER
LM for patient stating we will contact the billing department to have auditors review the note to see if [place] [id number] can be changed. Explained providers have to document everything they review during visits and the system will generate the appropriate charge. Also mentioned will mail to his home a paper that explains the difference between a physical versus problem/follow up or sick visit and what each one entails. Apologized for the confusion and I will contact him once I get a response from billing dept.

## 2024-07-01 DIAGNOSIS — Z00.6 ENCOUNTER FOR EXAMINATION FOR NORMAL COMPARISON OR CONTROL IN CLINICAL RESEARCH PROGRAM: ICD-10-CM

## 2024-07-18 ENCOUNTER — APPOINTMENT (OUTPATIENT)
Dept: LAB | Facility: CLINIC | Age: 59
End: 2024-07-18

## 2024-07-18 DIAGNOSIS — Z00.6 ENCOUNTER FOR EXAMINATION FOR NORMAL COMPARISON OR CONTROL IN CLINICAL RESEARCH PROGRAM: ICD-10-CM

## 2024-07-18 PROCEDURE — 36415 COLL VENOUS BLD VENIPUNCTURE: CPT

## 2024-09-18 LAB
APOB+LDLR+PCSK9 GENE MUT ANL BLD/T: NOT DETECTED
BRCA1+BRCA2 DEL+DUP + FULL MUT ANL BLD/T: NOT DETECTED
MLH1+MSH2+MSH6+PMS2 GN DEL+DUP+FUL M: NOT DETECTED

## 2024-12-04 ENCOUNTER — TELEPHONE (OUTPATIENT)
Dept: FAMILY MEDICINE CLINIC | Facility: CLINIC | Age: 59
End: 2024-12-04

## 2024-12-10 ENCOUNTER — RA CDI HCC (OUTPATIENT)
Dept: OTHER | Facility: HOSPITAL | Age: 59
End: 2024-12-10

## 2024-12-18 ENCOUNTER — OFFICE VISIT (OUTPATIENT)
Dept: FAMILY MEDICINE CLINIC | Facility: CLINIC | Age: 59
End: 2024-12-18
Payer: COMMERCIAL

## 2024-12-18 VITALS
OXYGEN SATURATION: 100 % | RESPIRATION RATE: 16 BRPM | TEMPERATURE: 96.1 F | HEART RATE: 45 BPM | HEIGHT: 70 IN | DIASTOLIC BLOOD PRESSURE: 72 MMHG | BODY MASS INDEX: 23.51 KG/M2 | SYSTOLIC BLOOD PRESSURE: 117 MMHG | WEIGHT: 164.2 LBS

## 2024-12-18 DIAGNOSIS — E78.2 MIXED HYPERLIPIDEMIA: ICD-10-CM

## 2024-12-18 DIAGNOSIS — Z00.00 ANNUAL PHYSICAL EXAM: Primary | ICD-10-CM

## 2024-12-18 DIAGNOSIS — N40.1 BENIGN PROSTATIC HYPERPLASIA WITH NOCTURIA: ICD-10-CM

## 2024-12-18 DIAGNOSIS — Z12.5 PROSTATE CANCER SCREENING: ICD-10-CM

## 2024-12-18 DIAGNOSIS — R35.1 BENIGN PROSTATIC HYPERPLASIA WITH NOCTURIA: ICD-10-CM

## 2024-12-18 DIAGNOSIS — D50.9 IRON DEFICIENCY ANEMIA, UNSPECIFIED IRON DEFICIENCY ANEMIA TYPE: ICD-10-CM

## 2024-12-18 DIAGNOSIS — M70.72 BURSITIS OF BOTH HIPS, UNSPECIFIED BURSA: ICD-10-CM

## 2024-12-18 DIAGNOSIS — M70.71 BURSITIS OF BOTH HIPS, UNSPECIFIED BURSA: ICD-10-CM

## 2024-12-18 DIAGNOSIS — K20.0 EOSINOPHILIC ESOPHAGITIS: Chronic | ICD-10-CM

## 2024-12-18 DIAGNOSIS — Z12.11 SCREENING FOR COLON CANCER: ICD-10-CM

## 2024-12-18 PROCEDURE — 99396 PREV VISIT EST AGE 40-64: CPT | Performed by: FAMILY MEDICINE

## 2024-12-18 NOTE — PROGRESS NOTES
Adult Annual Physical  Name: Jeffry Fitch      : 1965      MRN: [id number]  Encounter Provider: Marcellus Polo MD  Encounter Date: 2024   Encounter department: [facility]    Assessment & Plan  Annual physical exam  FBW ordered. PSA ordered. Active and consumes a balanced diet  Orders:    Comprehensive metabolic panel; Future    Lipid panel; Future    Benign prostatic hyperplasia with nocturia  Waking up 3-4 x a night  Not an issue during the day   Discussed starting flomax   Examination performed last year by previous PCP noted enlargement of the prostate but no nodules         Eosinophilic esophagitis  Diagnosed in    Experiences occasional dysphagia particularly wth meats and breads, which he avoids  Vegetarian the last 4 years   Had symptoms for 2 weeks but has resolved.  Stopped using PPI for 2 years  2 EGDs performed in the past  Abdominal exam unremarkable  Suggest taking PPI          Screening for colon cancer  Due for screening and will contact previous gastroenterologist to scheduled an appt        Bursitis of both hips, unspecified bursa  Chronic and managed with PT        Iron deficiency anemia, unspecified iron deficiency anemia type         Mixed hyperlipidemia  Lab Results   Component Value Date    CHOLESTEROL 170 2023    CHOLESTEROL 149 2019     Lab Results   Component Value Date    HDL 51 2023    HDL 41 2019     Lab Results   Component Value Date    TRIG 75 2023    TRIG 106 2019     Lab Results   Component Value Date    NONHDLC 119 2023    NONHDLC 108 2019     ASCVD < 7. Recommend diet control with low cholesterol diet and regular exercise   Orders:    Lipid panel; Future    Prostate cancer screening  Consented. Prostate exam was performed last year with PCP. Deferred exam this year. PSA ordered. Reports LUTS. Offered flomax but only present at night. Asked to limit fluids 2-3 hours before bed. Unlikely to  have VIVI   Orders:    PSA, Total Screen; Future    Immunizations and preventive care screenings were discussed with patient today. Appropriate education was printed on patient's after visit summary.    Discussed risks and benefits of prostate cancer screening. We discussed the controversial history of PSA screening for prostate cancer in the [place] as well as the risk of over detection and over treatment of prostate cancer by way of PSA screening.  The patient understands that PSA blood testing is an imperfect way to screen for prostate cancer and that elevated PSA levels in the blood may also be caused by infection, inflammation, prostatic trauma or manipulation, urological procedures, or by benign prostatic enlargement.    The role of the digital rectal examination in prostate cancer screening was also discussed and I discussed with him that there is large interobserver variability in the findings of digital rectal examination.    Counseling:  Dental Health: discussed importance of regular tooth brushing, flossing, and dental visits.  Injury prevention: discussed safety/seat belts, safety helmets, smoke detectors, carbon monoxide detectors, and smoking near bedding or upholstery.  Exercise: the importance of regular exercise/physical activity was discussed. Recommend exercise 3-5 times per week for at least 30 minutes.          History of Present Illness     Adult Annual Physical:  Patient presents for annual physical.     Diet and Physical Activity:  - Diet/Nutrition: well balanced diet. vegeterian  - Exercise: 3-4 times a week on average. triatholon in the summer, cardio, biking,    General Health:  - Sleep: sleeps well. waking up to urinate  - Hearing: normal hearing bilateral ears.  - Vision: no vision problems and wears glasses. near sighted and is seen yearly  - Dental: regular dental visits. seen within a month     Health:  - History of STDs: no.   - Urinary symptoms: none.     Advanced Care  Planning:  - Has an advanced directive?: no    - Has a durable medical POA?: no    - ACP document given to patient?: no      Review of Systems  Medical History Reviewed by provider this encounter:  Tobacco  Allergies  Meds  Problems  Med Hx  Surg Hx  Fam Hx     .  Past Medical History   Past Medical History:   Diagnosis Date    Abnormal findings on esophagogastroduodenoscopy (EGD)     Eosinophilic esophagitis     GERD (gastroesophageal reflux disease)      Past Surgical History:   Procedure Laterality Date    COLONOSCOPY      [facility] UNLISTED PROCEDURE FOOT/TOES Right 12/18/2020    Procedure: TRANSFER posterior tibial TENDON FOOT with removal of accessory navicular bone;  Surgeon: Jaime Nation DPM;  Location:  MAIN OR;  Service: Podiatry    UPPER GASTROINTESTINAL ENDOSCOPY       Family History   Problem Relation Age of Onset    Leukemia Mother     Stroke Mother     Diabetes type II Mother     Kidney cancer Father     Aortic aneurysm Father         AAA    Aortic dissection Brother     Leukemia Family     Diabetes Family       reports that he has never smoked. He has never used smokeless tobacco. He reports that he does not currently use alcohol. He reports that he does not use drugs.  Current Outpatient Medications on File Prior to Visit   Medication Sig Dispense Refill    omeprazole (PriLOSEC) 20 mg delayed release capsule TAKE 1 CAPSULE (20 MG TOTAL) BY MOUTH DAILY BEFORE BREAKFAST 30 capsule 17    triamcinolone (KENALOG) 0.5 % cream Apply topically 3 (three) times a day 30 g 6     No current facility-administered medications on file prior to visit.   No Known Allergies   Current Outpatient Medications on File Prior to Visit   Medication Sig Dispense Refill    omeprazole (PriLOSEC) 20 mg delayed release capsule TAKE 1 CAPSULE (20 MG TOTAL) BY MOUTH DAILY BEFORE BREAKFAST 30 capsule 17    triamcinolone (KENALOG) 0.5 % cream Apply topically 3 (three) times a day 30 g 6     No current facility-administered  "medications on file prior to visit.      Social History     Tobacco Use    Smoking status: Never    Smokeless tobacco: Never   Vaping Use    Vaping status: Never Used   Substance and Sexual Activity    Alcohol use: Not Currently    Drug use: Never    Sexual activity: Not Currently       Objective   /72 (BP Location: Left arm, Patient Position: Sitting, Cuff Size: Adult)   Pulse (!) 45   Temp (!) 96.1 °F (35.6 °C) (Tympanic)   Resp 16   Ht 5' 10\" (1.778 m)   Wt 74.5 kg (164 lb 3.2 oz)   SpO2 100%   BMI 23.56 kg/m²     Physical Exam  Constitutional:       General: He is not in acute distress.     Appearance: Normal appearance. He is not ill-appearing or toxic-appearing.   HENT:      Head: Normocephalic and atraumatic.      Right Ear: Tympanic membrane normal. There is no impacted cerumen.      Left Ear: Tympanic membrane normal. There is impacted cerumen.      Nose: No congestion.      Mouth/Throat:      Pharynx: No oropharyngeal exudate or posterior oropharyngeal erythema.   Eyes:      Extraocular Movements: Extraocular movements intact.   Cardiovascular:      Rate and Rhythm: Normal rate and regular rhythm.      Heart sounds: No murmur heard.  Pulmonary:      Effort: Pulmonary effort is normal.   Abdominal:      General: There is no distension.      Palpations: Abdomen is soft. There is no mass.      Tenderness: There is no abdominal tenderness. There is no guarding.      Hernia: No hernia is present.   Musculoskeletal:      Right lower leg: No edema.      Left lower leg: No edema.   Lymphadenopathy:      Cervical: No cervical adenopathy.   Skin:     General: Skin is warm.   Neurological:      Mental Status: He is alert.   Psychiatric:         Mood and Affect: Mood normal.         Behavior: Behavior normal.         "

## 2024-12-18 NOTE — ASSESSMENT & PLAN NOTE
Waking up 3-4 x a night  Not an issue during the day   Discussed starting flomax   Examination performed last year by previous PCP noted enlargement of the prostate but no nodules

## 2024-12-18 NOTE — ASSESSMENT & PLAN NOTE
Diagnosed in 2021   Experiences occasional dysphagia particularly wth meats and breads, which he avoids  Vegetarian the last 4 years   Had symptoms for 2 weeks but has resolved.  Stopped using PPI for 2 years  2 EGDs performed in the past  Abdominal exam unremarkable  Suggest taking PPI

## (undated) DEVICE — STERILE SLQ FOOT ANKLE PACK: Brand: CARDINAL HEALTH

## (undated) DEVICE — KIT MINI SUTURETAK DISP

## (undated) DEVICE — GLOVE SRG BIOGEL 7.5

## (undated) DEVICE — PRECISION THIN (9.0 X 0.38 X 25.0MM)

## (undated) DEVICE — GLOVE INDICATOR PI UNDERGLOVE SZ 6.5 BLUE

## (undated) DEVICE — SUT VICRYL 2-0 CT-1 27 IN J259H

## (undated) DEVICE — GLOVE SRG BIOGEL 6.5

## (undated) DEVICE — GLOVE SRG BIOGEL 7

## (undated) DEVICE — GLOVE INDICATOR PI UNDERGLOVE SZ 7.5 BLUE

## (undated) DEVICE — SUT VICRYL 3-0 SH 27 IN J416H